# Patient Record
Sex: FEMALE | Race: WHITE | NOT HISPANIC OR LATINO | Employment: OTHER | RURAL
[De-identification: names, ages, dates, MRNs, and addresses within clinical notes are randomized per-mention and may not be internally consistent; named-entity substitution may affect disease eponyms.]

---

## 2017-02-06 RX ORDER — INDAPAMIDE 1.25 MG/1
1.25 TABLET, FILM COATED ORAL DAILY
Qty: 90 TABLET | Refills: 2 | Status: SHIPPED | OUTPATIENT
Start: 2017-02-06 | End: 2017-04-12 | Stop reason: DRUGHIGH

## 2017-03-29 RX ORDER — LOSARTAN POTASSIUM 100 MG/1
100 TABLET ORAL DAILY
Qty: 90 TABLET | Refills: 2 | Status: SHIPPED | OUTPATIENT
Start: 2017-03-29 | End: 2017-12-06 | Stop reason: SDUPTHER

## 2017-03-29 RX ORDER — PRAVASTATIN SODIUM 80 MG/1
80 TABLET ORAL DAILY
Qty: 90 TABLET | Refills: 1 | Status: SHIPPED | OUTPATIENT
Start: 2017-03-29 | End: 2017-10-03 | Stop reason: SDUPTHER

## 2017-04-12 ENCOUNTER — OFFICE VISIT (OUTPATIENT)
Dept: FAMILY MEDICINE CLINIC | Facility: CLINIC | Age: 82
End: 2017-04-12

## 2017-04-12 VITALS
OXYGEN SATURATION: 96 % | HEART RATE: 68 BPM | TEMPERATURE: 97.5 F | DIASTOLIC BLOOD PRESSURE: 60 MMHG | WEIGHT: 121.6 LBS | HEIGHT: 62 IN | BODY MASS INDEX: 22.38 KG/M2 | SYSTOLIC BLOOD PRESSURE: 108 MMHG

## 2017-04-12 DIAGNOSIS — Z87.2: Primary | ICD-10-CM

## 2017-04-12 PROCEDURE — 99213 OFFICE O/P EST LOW 20 MIN: CPT | Performed by: FAMILY MEDICINE

## 2017-04-12 RX ORDER — CLINDAMYCIN PHOSPHATE 11.9 MG/ML
SOLUTION TOPICAL 2 TIMES DAILY
Qty: 30 ML | Refills: 2 | Status: SHIPPED | OUTPATIENT
Start: 2017-04-12 | End: 2017-08-16 | Stop reason: DRUGHIGH

## 2017-04-12 RX ORDER — INDAPAMIDE 1.25 MG/1
1.25 TABLET, FILM COATED ORAL EVERY OTHER DAY
COMMUNITY
End: 2018-02-27

## 2017-04-12 RX ORDER — ASPIRIN 81 MG/1
81 TABLET ORAL EVERY OTHER DAY
COMMUNITY
End: 2017-08-16 | Stop reason: DRUGHIGH

## 2017-04-12 NOTE — PROGRESS NOTES
"Ezekiel Lynch    1934    Chief Complaint   Patient presents with   • Acne     AROUND NOSE AREA       Vitals:    04/12/17 1523   BP: 108/60   BP Location: Left arm   Patient Position: Sitting   Cuff Size: Adult   Pulse: 68   Temp: 97.5 °F (36.4 °C)   TempSrc: Oral   SpO2: 96%   Weight: 121 lb 9.6 oz (55.2 kg)   Height: 62\" (157.5 cm)       Acne   This is a recurrent problem. The current episode started more than 1 year ago. The problem occurs intermittently. The problem has been gradually worsening. Associated symptoms include a rash. Nothing aggravates the symptoms. She has tried nothing for the symptoms.       Review of Systems   Skin: Positive for rash.        Acne rosacea       Past Medical History:   Diagnosis Date   • Hyperlipidemia    • Hypertension          Current Outpatient Prescriptions:   •  aspirin 81 MG EC tablet, Take 81 mg by mouth Every Other Day., Disp: , Rfl:   •  indapamide (LOZOL) 1.25 MG tablet, Take 1.25 mg by mouth Every Other Day. TAKE ONE TABLET BY MOUTH EVERY OTHER DAY.  Monday Wednesday AND FRIDAY, Disp: , Rfl:   •  losartan (COZAAR) 100 MG tablet, Take 1 tablet by mouth Daily., Disp: 90 tablet, Rfl: 2  •  omeprazole (PriLOSEC) 40 MG capsule, Take 40 mg by mouth Daily., Disp: , Rfl:   •  pravastatin (PRAVACHOL) 80 MG tablet, Take 1 tablet by mouth Daily., Disp: 90 tablet, Rfl: 1  •  sertraline (ZOLOFT) 25 MG tablet, Take 1 tablet by mouth Daily., Disp: 30 tablet, Rfl: 2  •  spironolactone (ALDACTONE) 25 MG tablet, Take 1 tablet by mouth Daily., Disp: 90 tablet, Rfl: 1  •  spironolactone (ALDACTONE) 25 MG tablet, , Disp: , Rfl:   •  traZODone (DESYREL) 50 MG tablet, Take 1 tablet by mouth Every Night., Disp: 90 tablet, Rfl: 3  •  clindamycin (CLEOCIN T) 1 % external solution, Apply  topically 2 (Two) Times a Day., Disp: 30 mL, Rfl: 2    No Known Allergies    Patient Active Problem List   Diagnosis   • Essential hypertension   • Malignant neoplasm of female breast       Social History " "    Social History   • Marital status:      Spouse name: N/A   • Number of children: N/A   • Years of education: N/A     Social History Main Topics   • Smoking status: Former Smoker   • Smokeless tobacco: None   • Alcohol use Yes   • Drug use: None   • Sexual activity: Not Asked     Other Topics Concern   • None     Social History Narrative       Past Surgical History:   Procedure Laterality Date   • APPENDECTOMY     • HAND TENDON SURGERY     • HYSTERECTOMY     • TONSILLECTOMY         Physical Exam   Constitutional: She appears well-developed and well-nourished.   HENT:   Right Ear: External ear normal.   Left Ear: External ear normal.   Mouth/Throat: Oropharynx is clear and moist.   Nasolabial folds show acne   Cardiovascular: Normal rate and regular rhythm.    Pulmonary/Chest: Effort normal and breath sounds normal. No respiratory distress. She has no wheezes. She has no rales.   Neurological: She is alert.   Psychiatric: She has a normal mood and affect.   Vitals reviewed.      Vitals:    04/12/17 1523   BP: 108/60   BP Location: Left arm   Patient Position: Sitting   Cuff Size: Adult   Pulse: 68   Temp: 97.5 °F (36.4 °C)   TempSrc: Oral   SpO2: 96%   Weight: 121 lb 9.6 oz (55.2 kg)   Height: 62\" (157.5 cm)       Ezekiel was seen today for acne.    Diagnoses and all orders for this visit:    H/O acne rosacea    Other orders  -     clindamycin (CLEOCIN T) 1 % external solution; Apply  topically 2 (Two) Times a Day.        Problem List Items Addressed This Visit     None      Visit Diagnoses     H/O acne rosacea    -  Primary                Plan clindamycin solution    Also, allergy flareup-Michaela Wren Lathan Edwards Settle, MD  4/12/2017  "

## 2017-07-24 LAB — HM MAMMOGRAM: NORMAL

## 2017-08-14 ENCOUNTER — TELEPHONE (OUTPATIENT)
Dept: FAMILY MEDICINE CLINIC | Facility: CLINIC | Age: 82
End: 2017-08-14

## 2017-08-14 ENCOUNTER — OFFICE VISIT (OUTPATIENT)
Dept: FAMILY MEDICINE CLINIC | Facility: CLINIC | Age: 82
End: 2017-08-14

## 2017-08-14 VITALS
OXYGEN SATURATION: 99 % | BODY MASS INDEX: 22.12 KG/M2 | SYSTOLIC BLOOD PRESSURE: 138 MMHG | TEMPERATURE: 98.7 F | DIASTOLIC BLOOD PRESSURE: 82 MMHG | WEIGHT: 120.2 LBS | HEART RATE: 80 BPM | HEIGHT: 62 IN

## 2017-08-14 DIAGNOSIS — R10.9 ABDOMINAL PAIN, UNSPECIFIED LOCATION: Primary | ICD-10-CM

## 2017-08-14 DIAGNOSIS — K59.00 CONSTIPATION, UNSPECIFIED CONSTIPATION TYPE: ICD-10-CM

## 2017-08-14 PROBLEM — K21.9 GASTROESOPHAGEAL REFLUX DISEASE WITHOUT ESOPHAGITIS: Status: ACTIVE | Noted: 2017-08-14

## 2017-08-14 PROBLEM — E78.5 HYPERLIPIDEMIA: Status: ACTIVE | Noted: 2017-08-14

## 2017-08-14 LAB
BILIRUB BLD-MCNC: NEGATIVE MG/DL
CLARITY, POC: CLEAR
COLOR UR: ABNORMAL
GLUCOSE UR STRIP-MCNC: NEGATIVE MG/DL
KETONES UR QL: NEGATIVE
LEUKOCYTE EST, POC: NEGATIVE
NITRITE UR-MCNC: NEGATIVE MG/ML
PH UR: 8.5 [PH] (ref 5–8)
PROT UR STRIP-MCNC: ABNORMAL MG/DL
RBC # UR STRIP: NEGATIVE /UL
SP GR UR: 1.01 (ref 1–1.03)
UROBILINOGEN UR QL: NORMAL

## 2017-08-14 PROCEDURE — 81003 URINALYSIS AUTO W/O SCOPE: CPT | Performed by: NURSE PRACTITIONER

## 2017-08-14 PROCEDURE — 99214 OFFICE O/P EST MOD 30 MIN: CPT | Performed by: NURSE PRACTITIONER

## 2017-08-14 NOTE — PATIENT INSTRUCTIONS
Abdominal Pain, Adult  Many things can cause abdominal pain. Usually, abdominal pain is not caused by a disease and will improve without treatment. It can often be observed and treated at home. Your health care provider will do a physical exam and possibly order blood tests and X-rays to help determine the seriousness of your pain. However, in many cases, more time must pass before a clear cause of the pain can be found. Before that point, your health care provider may not know if you need more testing or further treatment.  HOME CARE INSTRUCTIONS  Monitor your abdominal pain for any changes. The following actions may help to alleviate any discomfort you are experiencing:  · Only take over-the-counter or prescription medicines as directed by your health care provider.  · Do not take laxatives unless directed to do so by your health care provider.  · Try a clear liquid diet (broth, tea, or water) as directed by your health care provider. Slowly move to a bland diet as tolerated.  SEEK MEDICAL CARE IF:  · You have unexplained abdominal pain.  · You have abdominal pain associated with nausea or diarrhea.  · You have pain when you urinate or have a bowel movement.  · You experience abdominal pain that wakes you in the night.  · You have abdominal pain that is worsened or improved by eating food.  · You have abdominal pain that is worsened with eating fatty foods.  · You have a fever.  SEEK IMMEDIATE MEDICAL CARE IF:  · Your pain does not go away in the time recommended by your healthcare provider  · You keep throwing up (vomiting).  · Your pain is felt only in portions of the abdomen, such as the right side or the left lower portion of the abdomen.  · You pass bloody or black tarry stools.  MAKE SURE YOU:  · Understand these instructions.  · Will watch your condition.  · Will get help right away if you are not doing well or get worse.     This information is not intended to replace advice given to you by your health  care provider. Make sure you discuss any questions you have with your health care provider.     Document Released: 09/27/2006 Document Revised: 09/07/2016 Document Reviewed: 08/27/2014  [email or web address] Interactive Patient Education ©2017 [employer]

## 2017-08-14 NOTE — PROGRESS NOTES
Chief Complaint   Patient presents with   • Abdominal Pain        No Known Allergies    HPI:  Ezekiel Lynch is a 82 y.o. female presents  today with abdominal pain low, and back pain that started yesterday, denies any fever or chills.  Says she has constipation and she has been having a little bowel movement but says she was not emptying.  She was taking miralax and it worked well but she quit taking it about a yr ago because she had bowel movement 4-5 times a day. Says she has not had any in 1 yr and she thought she would take it again because she struggles with constipation.  Says that after taking it couple days ago that's when the problems began.  Has history of diverticulitis about 2 yrs ago.  Has been constipation so took milk of mag and had BM today, and pain is better but she is still hurting.  Chronic problems include rosacea stable with clindamycin cream, HTN stable with losartan says she doesn't take the lozol much, GERD stable with omeprazole, hyperlipidemia stable with pravastatin, insomnia stable with trazodone.          Past Medical History:   Diagnosis Date   • Hyperlipidemia      Past Surgical History:   Procedure Laterality Date   • APPENDECTOMY     • HAND TENDON SURGERY     • HYSTERECTOMY     • TONSILLECTOMY       Social History     Social History   • Marital status:      Spouse name: N/A   • Number of children: N/A   • Years of education: N/A     Social History Main Topics   • Smoking status: Former Smoker   • Smokeless tobacco: None   • Alcohol use Yes   • Drug use: None   • Sexual activity: Not Asked     Other Topics Concern   • None     Social History Narrative   • None     Family History   Problem Relation Age of Onset   • Heart attack Mother    • Cancer Father    • No Known Problems Daughter    • No Known Problems Maternal Grandmother    • No Known Problems Maternal Grandfather    • No Known Problems Paternal Grandmother    • No Known Problems Paternal Grandfather    • Brain cancer  "Son    • Diabetes Son        Current Outpatient Prescriptions on File Prior to Visit   Medication Sig Dispense Refill   • aspirin 81 MG EC tablet Take 81 mg by mouth Every Other Day.     • clindamycin (CLEOCIN T) 1 % external solution Apply  topically 2 (Two) Times a Day. 30 mL 2   • indapamide (LOZOL) 1.25 MG tablet Take 1.25 mg by mouth Every Other Day. TAKE ONE TABLET BY MOUTH EVERY OTHER DAY.  Monday Wednesday AND FRIDAY     • losartan (COZAAR) 100 MG tablet Take 1 tablet by mouth Daily. 90 tablet 2   • omeprazole (PriLOSEC) 40 MG capsule Take 40 mg by mouth Daily.     • pravastatin (PRAVACHOL) 80 MG tablet Take 1 tablet by mouth Daily. 90 tablet 1   • spironolactone (ALDACTONE) 25 MG tablet Take 1 tablet by mouth Daily. 90 tablet 1   • traZODone (DESYREL) 50 MG tablet Take 1 tablet by mouth Every Night. 90 tablet 3   • [DISCONTINUED] sertraline (ZOLOFT) 25 MG tablet Take 1 tablet by mouth Daily. 30 tablet 2   • [DISCONTINUED] spironolactone (ALDACTONE) 25 MG tablet        No current facility-administered medications on file prior to visit.         REVIEW OF SYMPTOMS: (Positives bolded)  General:  weight loss, fever, chills, night sweats, fatigue, appetite loss  Cardiovascular:  chest pain, palpitation, edema,  syncope, swelling of extremities  Gastro: Nausea, vomiting, diarrhea, hematemesis, abdominal pain, constipation  Genito: hematuria, dysuria, glycosuria, hesitancy, frequency, incontinence  Skin: rash,new lesions, pruritus, jaundice  Neuro:  Migraine, numbness, ataxia, tremor, vertigo, weakness, memory loss, Irritability, dizziness  Endocrine: excessive thirst, polyuria, cold intolerance, heat intolerance, goiter  \"All other systems reviewed and negative, except as listed above.”    OBJECTIVE:  Constitutional:  Appearance-No acute distress, Is pt in pain exhibited by facial expression, restlessness or constant movement.    Consistent with stated age. Orientation- Oriented x 3, alert Posture-Not doubled " over. Gait-Normal pace, normal arm movement. Posture- Normal Build and Nutrition-Well developed and well nourished.  General- Patient is pleasant and cooperative with the interview and exam.    Integumentary: General-No rashes, ulcers or lesions. No edema.  Jaundice. Palpation- Normal skin moisture/turgor. Skin is warm to touch, no increased warmth. Capillary refill is normal bilateral Upper and lower extremity.   EYES: PERRLA, EOMI, no discharge, no ciliary flushing, no conjunctival injection, no jaundice, upper and lower eyelids normal, Cornea normal,  Lens normal, Eye ball normal, No matting or discharge on eye lashes, Fundi normal, no papilledema, No Foreign body present      Head/Neck: Head- normocephalic and atraumatic.  Neck- without visible/palpable lumps or pulsations.  Palpation- No bony tenderness about head/neck along frontal, occiptial, temporal, parietal, mastoid, jawline, zygoma, orbit or any other location.  NO temporal artery tenderness. No TMJ tenderness. Normal cervical ROM.   Neck Supple.  Thyroid-No thyromegaly, no nodules    CHEST/LUNG: Inspection- symmetric chest wall no pectus deformity. Normal effort, no distress, no use of accessory muscles. Palpation- nontender sternum, ribline.  No abnormal pulsations. Auscultation- Breath sounds normal throughout all lung fields.  Normal tracheal sounds, Normal bronchial sounds overlying sternum, Bronchovessicular sounds normal between scapulae posteriorly, Normal vessicular breath sounds heard throughout periphery. Lungs are clear today. Adventitious sounds- No wheezes, rales, rhonchi.     CARDIOVASCULAR:  Carotid artery- normal, no bruits or abnormal pulsations. Jugular vein- no pulsations. Palpation/Percussion- Normal PMI, no palpable thrill  Auscultation- Regular rate and rhythm. No murmur noted in sitting, supine positions. Extremities- no digital clubbing, cyanosis, edema, increased warmth.    ABDOMEN: Inspection- normal and no visible pulsations.  "Normal contour. Auscultation- Bowel sounds normal, no abdominal bruits. Palpation/Percussion- soft, non-tender, no rebound tenderness, no rigidity (guarding), no jar tenderness, no masses.  Liver-no hepatomegaly, Spleen - no splenomegaly, Hernias- none. Rectal not examined. CVA tenderness    Musculoskeletal: Generalized-No generalized swelling or edema of extremities, no digital clubbing or cyanosis, neurovascularly intact all four extremities.    Spine/Ribs- No deformities, masses or tenderness, no known fractures, normal strength, Normal ROM. Normal stability No tenderness along C/T/L spine.  Normal appearing ROM about spine.      Neuropsych: Oriented- Person, place, time. (AAOx3), Mood/affect- normal and congruent. Able to articulate well. Speech-Normal speech, normal rate, normal tone, normal use of language, volume and coherence.  Thought content- normal with ability to perform basic computations and apply abstract thought/reason. Associations- intact, no SI/HI, no hallucinations, delusions, obsessions.  Judgment/insight- Appropriate. Memory-Recall intact, remote and recent memory intact. Knowledge- Age appropriate fund of knowledge, concentration and attention span normal.    Lymphatic: Head/Neck- normal size and non tender to palpation. Axillary- Head and neck LN are normal size and non tender to palpation. Femoral and Inguinal- normal size and non tender to palpation.    /82 (BP Location: Right arm, Patient Position: Sitting, Cuff Size: Adult)  Pulse 80  Temp 98.7 °F (37.1 °C) (Core)   Ht 62\" (157.5 cm)  Wt 120 lb 3.2 oz (54.5 kg)  LMP  (LMP Unknown)  SpO2 99%  BMI 21.98 kg/m2  Check for orthostatic changes in BP    Assessment  Ezekiel was seen today for abdominal pain.    Diagnoses and all orders for this visit:    Abdominal pain, unspecified location  -     POCT urinalysis dipstick, automated  -     XR Abdomen Flat & Upright  -     CBC & Differential  -     Comprehensive Metabolic Panel  -     " Amylase  -     Lipase    Constipation, unspecified constipation type  -     XR Abdomen Flat & Upright:  I will review xray when results are available and make recommendation.        Definition:  Recent onset (less than 24-48 hrs) of moderate to moderately severe abdominal pain that has either progressively worsened or failed to resolve.  Abdominal pain comprises approximately 10% of ER visits.  For patients seeking care in primary care settings, it is important to differentiate between those requiring urgent or emergent care.     Differential:  Diffuse or Generalized: Peritonitis, gastroenteritis, endocrine disorders (diabetic ketoacidosis, david disease), small bowel obstruction  RUQ: Disease of biliary tract, (cholelithiasis, gallstones, acute cholecystitis), disease of the liver (acute viral or drug-induced hepatitis), Acute pancreatitis, extra-abdominal etiologies (pleural or pulmonary pathology)  RLQ: Appendicitis, reproductive tract disorders in women (ovarian cyst/torsion, ectopic pregnancy, pelvic inflammatory disease, endometriosis) Extra-abdominal etiologies (retroperitoneal pathology such as uretic obstruction, cystitis)  LUQ: Dyspepsia, disease of the spleen (abscess, infarct), peptic ulcer disease, extra-abdominal etiologies (myocardial ischemia/infarction, pleural or pulmonary pathology)  LLQ: Diverticular disease (diverticulitis), colitis, ileitis, reproductive tract disorders and extra-abdominal etiologies as described for RLQ  Midline Locations: (Epigastric, periumbilical, suprapubic), acute pancreatitis, functional dyspepsia, hepatobiliary etiologies, small bowel obstruction, extra-abdominal etiologies (myocardial infarction)         POCT urinalysis dipstick, automated   Order: [id number]   Status:  Final result   Visible to patient:  No (Not Released) Dx:  Abdominal pain, unspecified location         Ref Range & Units 9:43 AM   9mo ago      Color Yellow, Straw, Dark Yellow, Fernanda Dark Yellow  Yellow    Clarity, UA Clear Clear Clear    Glucose, UA Negative, 1000 mg/dL (3+) mg/dL Negative NegativeR    Bilirubin Negative Negative Negative    Ketones, UA Negative Negative Negative    Specific Gravity  1.005 - 1.030 1.015 1.015    Blood, UA Negative Negative Negative    pH, Urine 5.0 - 8.0 8.5 (A) 7.0    Protein, POC Negative mg/dL Trace (A) Negative    Urobilinogen, UA Normal Normal Normal    Leukocytes Negative Negative Negative    Nitrite, UA Negative Negative Negative   Resulting Agency  [facility]      Specimen Collected: 08/14/17  9:43 AM Last Resulted: 08/14/17  9:43 AM                Go directly to ER if pain worsens or continues other winter follow up with Dr. Lazo 1 week.    Ada Edouard, NATALIE, APRN-BC  Ada Edouard DNP, APRN-BC

## 2017-08-14 NOTE — TELEPHONE ENCOUNTER
PER NASIM-PT WAS ADVISED TO F/U IN OFFICE UPON DR BARRAGAN RETURN TO DISCUSS XRAY RESULT-ADVISED CALCIFIED SPLENIC AORTIC ANEURYSM. GO TO ER IF PAIN PERSISTS. PT VOICED UNDERSTANDING. APPT MADE FOR 08/16/17.

## 2017-08-15 LAB
ALBUMIN SERPL-MCNC: 4.5 G/DL (ref 3.5–5)
ALBUMIN/GLOB SERPL: 1.7 G/DL (ref 1.1–2.5)
ALP SERPL-CCNC: 76 U/L (ref 24–120)
ALT SERPL-CCNC: 40 U/L (ref 0–54)
AMYLASE SERPL-CCNC: 52 U/L (ref 30–110)
AST SERPL-CCNC: 26 U/L (ref 7–45)
BASOPHILS # BLD AUTO: 0.03 10*3/MM3 (ref 0–0.2)
BASOPHILS NFR BLD AUTO: 0.3 % (ref 0–2)
BILIRUB SERPL-MCNC: 1 MG/DL (ref 0.1–1)
BUN SERPL-MCNC: 11 MG/DL (ref 5–21)
BUN/CREAT SERPL: 17.7 (ref 7–25)
CALCIUM SERPL-MCNC: 9.7 MG/DL (ref 8.4–10.4)
CHLORIDE SERPL-SCNC: 97 MMOL/L (ref 98–110)
CO2 SERPL-SCNC: 26 MMOL/L (ref 24–31)
CREAT SERPL-MCNC: 0.62 MG/DL (ref 0.5–1.4)
EOSINOPHIL # BLD AUTO: 0.04 10*3/MM3 (ref 0–0.7)
EOSINOPHIL NFR BLD AUTO: 0.4 % (ref 0–4)
ERYTHROCYTE [DISTWIDTH] IN BLOOD BY AUTOMATED COUNT: 12.9 % (ref 12–15)
GLOBULIN SER CALC-MCNC: 2.6 GM/DL
GLUCOSE SERPL-MCNC: 103 MG/DL (ref 70–100)
HCT VFR BLD AUTO: 38.1 % (ref 37–47)
HGB BLD-MCNC: 12.5 G/DL (ref 12–16)
IMM GRANULOCYTES # BLD: 0.03 10*3/MM3 (ref 0–0.03)
IMM GRANULOCYTES NFR BLD: 0.3 % (ref 0–5)
LIPASE SERPL-CCNC: 147 U/L (ref 23–203)
LYMPHOCYTES # BLD AUTO: 2.13 10*3/MM3 (ref 0.72–4.86)
LYMPHOCYTES NFR BLD AUTO: 20.1 % (ref 15–45)
MCH RBC QN AUTO: 29.3 PG (ref 28–32)
MCHC RBC AUTO-ENTMCNC: 32.8 G/DL (ref 33–36)
MCV RBC AUTO: 89.4 FL (ref 82–98)
MONOCYTES # BLD AUTO: 1.1 10*3/MM3 (ref 0.19–1.3)
MONOCYTES NFR BLD AUTO: 10.4 % (ref 4–12)
NEUTROPHILS # BLD AUTO: 7.26 10*3/MM3 (ref 1.87–8.4)
NEUTROPHILS NFR BLD AUTO: 68.5 % (ref 39–78)
PLATELET # BLD AUTO: 263 10*3/MM3 (ref 130–400)
POTASSIUM SERPL-SCNC: 4.4 MMOL/L (ref 3.5–5.3)
PROT SERPL-MCNC: 7.1 G/DL (ref 6.3–8.7)
RBC # BLD AUTO: 4.26 10*6/MM3 (ref 4.2–5.4)
SODIUM SERPL-SCNC: 132 MMOL/L (ref 135–145)
WBC # BLD AUTO: 10.59 10*3/MM3 (ref 4.8–10.8)

## 2017-08-16 ENCOUNTER — OFFICE VISIT (OUTPATIENT)
Dept: FAMILY MEDICINE CLINIC | Facility: CLINIC | Age: 82
End: 2017-08-16

## 2017-08-16 VITALS
SYSTOLIC BLOOD PRESSURE: 132 MMHG | DIASTOLIC BLOOD PRESSURE: 76 MMHG | HEART RATE: 84 BPM | OXYGEN SATURATION: 99 % | TEMPERATURE: 98.4 F | HEIGHT: 62 IN | WEIGHT: 118.2 LBS | BODY MASS INDEX: 21.75 KG/M2

## 2017-08-16 DIAGNOSIS — R10.32 LEFT LOWER QUADRANT PAIN: Primary | ICD-10-CM

## 2017-08-16 PROCEDURE — 99213 OFFICE O/P EST LOW 20 MIN: CPT | Performed by: FAMILY MEDICINE

## 2017-08-16 RX ORDER — ASPIRIN 81 MG/1
81 TABLET ORAL EVERY OTHER DAY
COMMUNITY

## 2017-08-16 RX ORDER — CLINDAMYCIN PHOSPHATE 10 UG/ML
LOTION TOPICAL AS NEEDED
COMMUNITY
End: 2019-06-07 | Stop reason: SDUPTHER

## 2017-08-16 RX ORDER — FAMOTIDINE 20 MG
1000 TABLET ORAL DAILY
COMMUNITY

## 2017-08-16 NOTE — PROGRESS NOTES
Subjective   Ezekiel Lynch is a 82 y.o. female.     Abdominal Pain   This is a new problem. The current episode started in the past 7 days. The onset quality is gradual. The problem occurs intermittently. The problem has been gradually improving. The pain is located in the LLQ. The pain is at a severity of 2/10. The pain is mild. The quality of the pain is colicky. The abdominal pain radiates to the LLQ. Associated symptoms include constipation. The pain is aggravated by bowel movement. The pain is relieved by bowel movements. She has tried nothing for the symptoms. The treatment provided mild relief. Her past medical history is significant for abdominal surgery.       The following portions of the patient's history were reviewed and updated as appropriate: allergies, current medications, past family history, past medical history, past social history, past surgical history and problem list.    Review of Systems   Respiratory: Negative for chest tightness.    Cardiovascular: Negative for leg swelling.   Gastrointestinal: Positive for abdominal pain and constipation.       Objective   Physical Exam   Constitutional: She is oriented to person, place, and time. She appears well-developed and well-nourished.   Abdominal: Soft. Bowel sounds are normal. There is no tenderness.   Musculoskeletal: She exhibits no edema.   Neurological: She is alert and oriented to person, place, and time.   Skin: Skin is warm and dry.   Psychiatric: She has a normal mood and affect. Her behavior is normal. Thought content normal.   Vitals reviewed.      Assessment/Plan   Ezekiel was seen today for follow-up.    Diagnoses and all orders for this visit:    Left lower quadrant pain       Continue dietary management of apparent diverticulosis-improved

## 2017-10-03 ENCOUNTER — CLINICAL SUPPORT (OUTPATIENT)
Dept: FAMILY MEDICINE CLINIC | Facility: CLINIC | Age: 82
End: 2017-10-03

## 2017-10-03 DIAGNOSIS — Z23 NEED FOR INFLUENZA VACCINATION: Primary | ICD-10-CM

## 2017-10-03 PROCEDURE — 90662 IIV NO PRSV INCREASED AG IM: CPT | Performed by: FAMILY MEDICINE

## 2017-10-03 PROCEDURE — 90471 IMMUNIZATION ADMIN: CPT | Performed by: FAMILY MEDICINE

## 2017-10-03 RX ORDER — PRAVASTATIN SODIUM 80 MG/1
TABLET ORAL
Qty: 90 TABLET | Refills: 0 | Status: SHIPPED | OUTPATIENT
Start: 2017-10-03 | End: 2017-12-06 | Stop reason: SDUPTHER

## 2017-10-20 ENCOUNTER — CLINICAL SUPPORT (OUTPATIENT)
Dept: FAMILY MEDICINE CLINIC | Facility: CLINIC | Age: 82
End: 2017-10-20

## 2017-10-20 DIAGNOSIS — N39.0 URINARY TRACT INFECTION WITHOUT HEMATURIA, SITE UNSPECIFIED: Primary | ICD-10-CM

## 2017-10-20 LAB
BILIRUB BLD-MCNC: NEGATIVE MG/DL
CLARITY, POC: CLEAR
COLOR UR: YELLOW
GLUCOSE UR STRIP-MCNC: NEGATIVE MG/DL
KETONES UR QL: NEGATIVE
LEUKOCYTE EST, POC: NEGATIVE
NITRITE UR-MCNC: NEGATIVE MG/ML
PH UR: 6 [PH] (ref 5–8)
PROT UR STRIP-MCNC: NEGATIVE MG/DL
RBC # UR STRIP: NEGATIVE /UL
SP GR UR: 1.01 (ref 1–1.03)
UROBILINOGEN UR QL: NORMAL

## 2017-10-20 PROCEDURE — 81003 URINALYSIS AUTO W/O SCOPE: CPT | Performed by: FAMILY MEDICINE

## 2017-11-01 RX ORDER — SPIRONOLACTONE 25 MG/1
25 TABLET ORAL DAILY
Qty: 90 TABLET | Refills: 2 | Status: SHIPPED | OUTPATIENT
Start: 2017-11-01 | End: 2018-11-26 | Stop reason: SDUPTHER

## 2017-12-05 ENCOUNTER — OFFICE VISIT (OUTPATIENT)
Dept: FAMILY MEDICINE CLINIC | Facility: CLINIC | Age: 82
End: 2017-12-05

## 2017-12-05 VITALS
SYSTOLIC BLOOD PRESSURE: 116 MMHG | WEIGHT: 118.6 LBS | HEIGHT: 63 IN | BODY MASS INDEX: 21.02 KG/M2 | DIASTOLIC BLOOD PRESSURE: 64 MMHG | HEART RATE: 69 BPM | TEMPERATURE: 97.7 F | OXYGEN SATURATION: 98 %

## 2017-12-05 DIAGNOSIS — E78.2 MIXED HYPERLIPIDEMIA: ICD-10-CM

## 2017-12-05 DIAGNOSIS — R53.83 FATIGUE, UNSPECIFIED TYPE: ICD-10-CM

## 2017-12-05 DIAGNOSIS — Z12.12 SCREENING FOR MALIGNANT NEOPLASM OF THE RECTUM: ICD-10-CM

## 2017-12-05 DIAGNOSIS — E55.9 VITAMIN D INSUFFICIENCY: ICD-10-CM

## 2017-12-05 DIAGNOSIS — Z00.00 ANNUAL PHYSICAL EXAM: Primary | ICD-10-CM

## 2017-12-05 LAB
25(OH)D3+25(OH)D2 SERPL-MCNC: 48.9 NG/ML (ref 30–100)
ALBUMIN SERPL-MCNC: 4.6 G/DL (ref 3.5–5)
ALBUMIN/GLOB SERPL: 1.6 G/DL (ref 1.1–2.5)
ALP SERPL-CCNC: 77 U/L (ref 24–120)
ALT SERPL-CCNC: 38 U/L (ref 0–54)
AST SERPL-CCNC: 32 U/L (ref 7–45)
BASOPHILS # BLD AUTO: 0.08 10*3/MM3 (ref 0–0.2)
BASOPHILS NFR BLD AUTO: 1.4 % (ref 0–2)
BILIRUB BLD-MCNC: NEGATIVE MG/DL
BILIRUB SERPL-MCNC: 0.8 MG/DL (ref 0.1–1)
BUN SERPL-MCNC: 12 MG/DL (ref 5–21)
BUN/CREAT SERPL: 16.9 (ref 7–25)
CALCIUM SERPL-MCNC: 10.1 MG/DL (ref 8.4–10.4)
CHLORIDE SERPL-SCNC: 96 MMOL/L (ref 98–110)
CHOLEST SERPL-MCNC: 199 MG/DL (ref 130–200)
CLARITY, POC: CLEAR
CO2 SERPL-SCNC: 28 MMOL/L (ref 24–31)
COLOR UR: YELLOW
CREAT SERPL-MCNC: 0.71 MG/DL (ref 0.5–1.4)
EOSINOPHIL # BLD AUTO: 0.06 10*3/MM3 (ref 0–0.7)
EOSINOPHIL NFR BLD AUTO: 1.1 % (ref 0–4)
ERYTHROCYTE [DISTWIDTH] IN BLOOD BY AUTOMATED COUNT: 12.5 % (ref 12–15)
GFR SERPLBLD CREATININE-BSD FMLA CKD-EPI: 79 ML/MIN/1.73
GFR SERPLBLD CREATININE-BSD FMLA CKD-EPI: 95 ML/MIN/1.73
GLOBULIN SER CALC-MCNC: 2.9 GM/DL
GLUCOSE SERPL-MCNC: 94 MG/DL (ref 70–100)
GLUCOSE UR STRIP-MCNC: NEGATIVE MG/DL
HCT VFR BLD AUTO: 40.8 % (ref 37–47)
HDLC SERPL-MCNC: 73 MG/DL
HGB BLD-MCNC: 13.5 G/DL (ref 12–16)
IMM GRANULOCYTES # BLD: 0.02 10*3/MM3 (ref 0–0.03)
IMM GRANULOCYTES NFR BLD: 0.4 % (ref 0–5)
KETONES UR QL: NEGATIVE
LDLC SERPL CALC-MCNC: 105 MG/DL (ref 0–99)
LEUKOCYTE EST, POC: NEGATIVE
LYMPHOCYTES # BLD AUTO: 3.08 10*3/MM3 (ref 0.72–4.86)
LYMPHOCYTES NFR BLD AUTO: 54.5 % (ref 15–45)
MCH RBC QN AUTO: 29.1 PG (ref 28–32)
MCHC RBC AUTO-ENTMCNC: 33.1 G/DL (ref 33–36)
MCV RBC AUTO: 87.9 FL (ref 82–98)
MONOCYTES # BLD AUTO: 0.49 10*3/MM3 (ref 0.19–1.3)
MONOCYTES NFR BLD AUTO: 8.7 % (ref 4–12)
NEUTROPHILS # BLD AUTO: 1.92 10*3/MM3 (ref 1.87–8.4)
NEUTROPHILS NFR BLD AUTO: 33.9 % (ref 39–78)
NITRITE UR-MCNC: NEGATIVE MG/ML
NRBC BLD AUTO-RTO: 0 /100 WBC (ref 0–0)
PH UR: 7 [PH] (ref 5–8)
PLATELET # BLD AUTO: 327 10*3/MM3 (ref 130–400)
POTASSIUM SERPL-SCNC: 4.7 MMOL/L (ref 3.5–5.3)
PROT SERPL-MCNC: 7.5 G/DL (ref 6.3–8.7)
PROT UR STRIP-MCNC: NEGATIVE MG/DL
RBC # BLD AUTO: 4.64 10*6/MM3 (ref 4.2–5.4)
RBC # UR STRIP: NEGATIVE /UL
SODIUM SERPL-SCNC: 133 MMOL/L (ref 135–145)
SP GR UR: 1.01 (ref 1–1.03)
T4 FREE SERPL-MCNC: 1.2 NG/DL (ref 0.78–2.19)
TRIGL SERPL-MCNC: 105 MG/DL (ref 0–149)
TSH SERPL DL<=0.005 MIU/L-ACNC: 2.75 MIU/ML (ref 0.47–4.68)
UROBILINOGEN UR QL: NORMAL
VLDLC SERPL CALC-MCNC: 21 MG/DL
WBC # BLD AUTO: 5.65 10*3/MM3 (ref 4.8–10.8)

## 2017-12-05 PROCEDURE — 81003 URINALYSIS AUTO W/O SCOPE: CPT | Performed by: FAMILY MEDICINE

## 2017-12-05 PROCEDURE — 96160 PT-FOCUSED HLTH RISK ASSMT: CPT | Performed by: FAMILY MEDICINE

## 2017-12-05 PROCEDURE — G0439 PPPS, SUBSEQ VISIT: HCPCS | Performed by: FAMILY MEDICINE

## 2017-12-05 RX ORDER — DOXYCYCLINE HYCLATE 50 MG/1
50 CAPSULE ORAL DAILY
COMMUNITY
Start: 2017-11-29 | End: 2018-02-27

## 2017-12-05 NOTE — PROGRESS NOTES
"QUICK REFERENCE INFORMATION:  The ABCs of the Annual Wellness Visit    Subsequent Medicare Wellness Visit    HEALTH RISK ASSESSMENT    1934    Recent Hospitalizations:  No hospitalization(s) within the last year..        Current Medical Providers:  Patient Care Team:  Lee Akins MD as PCP - General (Family Medicine)        Smoking Status:  History   Smoking Status   • Former Smoker   Smokeless Tobacco   • Not on file       Alcohol Consumption:  History   Alcohol Use   • Yes       Depression Screen:   PHQ-2/PHQ-9 Depression Screening 8/14/2017   Little interest or pleasure in doing things 0   Feeling down, depressed, or hopeless 0   Trouble falling or staying asleep, or sleeping too much -   Feeling tired or having little energy -   Poor appetite or overeating -   Feeling bad about yourself - or that you are a failure or have let yourself or your family down -   Trouble concentrating on things, such as reading the newspaper or watching television -   Moving or speaking so slowly that other people could have noticed. Or the opposite - being so fidgety or restless that you have been moving around a lot more than usual -   Thoughts that you would be better off dead, or of hurting yourself in some way -   Total Score 0   If you checked off any problems, how difficult have these problems made it for you to do your work, take care of things at home, or get along with other people? -      -- The Timed Up and Go (TUG) Test (CDC Version)                  - Testing directions adhered to:                                  1) Patients wears regular footwear and may use walking aid(s) if    needed.                                  2) Patient to sit back in standard arm chair and identify a line 10 feet    away from patient on floor.                                  3) Test time begins at \"Go\" and stops when patient reseated in arm    chair.                    - Instructions read aloud (and demonstrated as " "applicable) to patient:                                      When I say \"Go,\" I want you to:                                    1) Stand up from the chair.                                  2) Walk to the line on the floor (10 feet away) at your normal pace.                                  3) Turn.                                  4) Walk back to the chair at your normal pace.                                  5) Sit down again.                    - Result: 3                    - Observations during test:Normal gait        Health Habits and Functional and Cognitive Screening:  No flowsheet data found.        Does the patient have evidence of cognitive impairment? Yes    Aspirin use counseling: Taking ASA appropriately as indicated      Recent Lab Results:  CMP:  Lab Results   Component Value Date     (H) 08/14/2017    BUN 11 08/14/2017    CREATININE 0.62 08/14/2017    EGFRIFNONA 92 08/14/2017    EGFRIFAFRI 112 08/14/2017    BCR 17.7 08/14/2017     (L) 08/14/2017    K 4.4 08/14/2017    CO2 26.0 08/14/2017    CALCIUM 9.7 08/14/2017    PROTENTOTREF 7.1 08/14/2017    ALBUMIN 4.50 08/14/2017    LABGLOBREF 2.6 08/14/2017    LABIL2 1.7 08/14/2017    BILITOT 1.0 08/14/2017    ALKPHOS 76 08/14/2017    AST 26 08/14/2017    ALT 40 08/14/2017     Lipid Panel:  Lab Results   Component Value Date    TRIG 164 (H) 11/14/2016    HDL 69 11/14/2016    VLDL 32.8 11/14/2016     HbA1c:       Visual Acuity:  Right ey 20/20 and Left eye 20/30 w/glasses.    Age-appropriate Screening Schedule:  Refer to the list below for future screening recommendations based on patient's age, sex and/or medical conditions. Orders for these recommended tests are listed in the plan section. The patient has been provided with a written plan.    Health Maintenance   Topic Date Due   • TDAP/TD VACCINES (2 - Td) 04/17/2017   • LIPID PANEL  11/14/2017   • INFLUENZA VACCINE  Completed   • PNEUMOCOCCAL VACCINES (65+ LOW/MEDIUM RISK)  Completed   • " ZOSTER VACCINE  Completed        Subjective   History of Present Illness    Ezekiel Lynch is a 83 y.o. female who presents for an Subsequent Wellness Visit.    The following portions of the patient's history were reviewed and updated as appropriate: allergies, current medications, past family history, past medical history, past social history, past surgical history and problem list.    Outpatient Medications Prior to Visit   Medication Sig Dispense Refill   • aspirin 81 MG EC tablet Take 81 mg by mouth Every Other Day.     • clindamycin (CLEOCIN T) 1 % lotion Apply  topically As Needed.     • Docusate Calcium (STOOL SOFTENER PO) Take  by mouth As Needed.     • indapamide (LOZOL) 1.25 MG tablet Take 1.25 mg by mouth Every Other Day. TAKE ONE TABLET BY MOUTH EVERY OTHER DAY.  Monday Wednesday AND FRIDAY     • losartan (COZAAR) 100 MG tablet Take 1 tablet by mouth Daily. 90 tablet 2   • omeprazole (PriLOSEC) 40 MG capsule Take 40 mg by mouth Daily.     • pravastatin (PRAVACHOL) 80 MG tablet TAKE 1 TABLET EVERY DAY 90 tablet 0   • spironolactone (ALDACTONE) 25 MG tablet Take 1 tablet by mouth Daily. 90 tablet 2   • Vitamin D, Cholecalciferol, 1000 units capsule Take 1,000 Units by mouth Daily.       No facility-administered medications prior to visit.        Patient Active Problem List   Diagnosis   • Essential hypertension   • Malignant neoplasm of female breast   • Hyperlipidemia   • Gastroesophageal reflux disease without esophagitis       Advance Care Planning:  has NO advance directive - information provided to the patient today    Identification of Risk Factors:  Risk factors include: cardiovascular risk and increased fall risk.    Review of Systems   Genitourinary:        Sees [facility]/oncologist yearly   All other systems reviewed and are negative.      Compared to one year ago, the patient feels her physical health is the same.  Compared to one year ago, the patient feels her mental health is the  "same.    Objective     Physical Exam   Constitutional: She is oriented to person, place, and time. She appears well-developed and well-nourished.   HENT:   Right Ear: External ear normal.   Left Ear: External ear normal.   Mouth/Throat: Oropharynx is clear and moist.   Eyes: EOM are normal. Pupils are equal, round, and reactive to light.   Neck: No thyromegaly present.   Good carotid pulses   Cardiovascular: Normal rate and regular rhythm.    Pulmonary/Chest: Effort normal and breath sounds normal.   Breasts-no masses noted-remote scar on right   Abdominal: Soft. Bowel sounds are normal. She exhibits no mass. There is no tenderness.   Musculoskeletal: Normal range of motion.   Lymphadenopathy:     She has no cervical adenopathy.   Neurological: She is alert and oriented to person, place, and time.   Skin: Skin is warm and dry.   Psychiatric: She has a normal mood and affect. Her behavior is normal. Judgment and thought content normal.   Nursing note and vitals reviewed.      Vitals:    12/05/17 1429   BP: 116/64   BP Location: Left arm   Patient Position: Sitting   Cuff Size: Adult   Pulse: 69   Temp: 97.7 °F (36.5 °C)   TempSrc: Oral   SpO2: 98%   Weight: 53.8 kg (118 lb 9.6 oz)   Height: 160 cm (63\")   PainSc: 0-No pain       Body mass index is 21.01 kg/(m^2).  Discussed the patient's BMI with her. BMI is within normal parameters. No follow-up required..    Assessment/Plan   Patient Self-Management and Personalized Health Advice  The patient has been provided with information about: exercise, prevention of cardiac or vascular disease and fall prevention and preventive services including:   · Colorectal cancer screening, fecal occult blood test, Fall Risk plan of care done.    Visit Diagnoses:    ICD-10-CM ICD-9-CM   1. Annual physical exam Z00.00 V70.0   2. Fatigue, unspecified type R53.83 780.79   3. Mixed hyperlipidemia E78.2 272.2   4. Vitamin D insufficiency E55.9 268.9   5. Screening for malignant neoplasm of " the rectum Z12.12 V76.41       Orders Placed This Encounter   Procedures   • TSH   • T4, free   • Comprehensive Metabolic Panel   • Lipid Panel   • Vitamin D 25 Hydroxy   • POC Occult Blood X 3, Stool     Standing Status:   Future     Standing Expiration Date:   12/5/2018   • CBC & Differential     Order Specific Question:   Manual Differential     Answer:   No       Outpatient Encounter Prescriptions as of 12/5/2017   Medication Sig Dispense Refill   • aspirin 81 MG EC tablet Take 81 mg by mouth Every Other Day.     • clindamycin (CLEOCIN T) 1 % lotion Apply  topically As Needed.     • Docusate Calcium (STOOL SOFTENER PO) Take  by mouth As Needed.     • indapamide (LOZOL) 1.25 MG tablet Take 1.25 mg by mouth Every Other Day. TAKE ONE TABLET BY MOUTH EVERY OTHER DAY.  Monday Wednesday AND FRIDAY     • losartan (COZAAR) 100 MG tablet Take 1 tablet by mouth Daily. 90 tablet 2   • omeprazole (PriLOSEC) 40 MG capsule Take 40 mg by mouth Daily.     • pravastatin (PRAVACHOL) 80 MG tablet TAKE 1 TABLET EVERY DAY 90 tablet 0   • spironolactone (ALDACTONE) 25 MG tablet Take 1 tablet by mouth Daily. 90 tablet 2   • Vitamin D, Cholecalciferol, 1000 units capsule Take 1,000 Units by mouth Daily.       No facility-administered encounter medications on file as of 12/5/2017.        Reviewed use of high risk medication in the elderly: yes  Reviewed for potential of harmful drug interactions in the elderly: yes    Follow Up:  Return in 6 months (on 6/5/2018).     An After Visit Summary and PPPS with all of these plans were given to the patient.       Follow-up with oncologist at [facility]

## 2017-12-05 NOTE — PATIENT INSTRUCTIONS
Exercising to Stay Healthy  Exercising regularly is important. It has many health benefits, such as:  · Improving your overall fitness, flexibility, and endurance.  · Increasing your bone density.  · Helping with weight control.  · Decreasing your body fat.  · Increasing your muscle strength.  · Reducing stress and tension.  · Improving your overall health.  In order to become healthy and stay healthy, it is recommended that you do moderate-intensity and vigorous-intensity exercise. You can tell that you are exercising at a moderate intensity if you have a higher heart rate and faster breathing, but you are still able to hold a conversation. You can tell that you are exercising at a vigorous intensity if you are breathing much harder and faster and cannot hold a conversation while exercising.  HOW OFTEN SHOULD I EXERCISE?  Choose an activity that you enjoy and set realistic goals. Your health care provider can help you to make an activity plan that works for you. Exercise regularly as directed by your health care provider. This may include:   · Doing resistance training twice each week, such as:    Push-ups.    Sit-ups.    Lifting weights.    Using resistance bands.  · Doing a given intensity of exercise for a given amount of time. Choose from these options:    150 minutes of moderate-intensity exercise every week.    75 minutes of vigorous-intensity exercise every week.    A mix of moderate-intensity and vigorous-intensity exercise every week.  Children, pregnant women, people who are out of shape, people who are overweight, and older adults may need to consult a health care provider for individual recommendations. If you have any sort of medical condition, be sure to consult your health care provider before starting a new exercise program.   WHAT ARE SOME EXERCISE IDEAS?  Some moderate-intensity exercise ideas include:   · Walking at a rate of 1 mile in 15  minutes.  · Biking.  · Hiking.  · Golfing.  · Dancing.  Some vigorous-intensity exercise ideas include:   · Walking at a rate of at least 4.5 miles per hour.  · Jogging or running at a rate of 5 miles per hour.  · Biking at a rate of at least 10 miles per hour.  · Lap swimming.  · Roller-skating or in-line skating.  · Cross-country skiing.  · Vigorous competitive sports, such as football, basketball, and soccer.  · Jumping rope.  · Aerobic dancing.  WHAT ARE SOME EVERYDAY ACTIVITIES THAT CAN HELP ME TO GET EXERCISE?  · Yard work, such as:    Pushing a .    Raking and bagging leaves.  · Washing and waxing your car.  · Pushing a stroller.  · Shoveling snow.  · Gardening.  · Washing windows or floors.  HOW CAN I BE MORE ACTIVE IN MY DAY-TO-DAY ACTIVITIES?  · Use the stairs instead of the elevator.  · Take a walk during your lunch break.  · If you drive, park your car farther away from work or school.  · If you take public transportation, get off one stop early and walk the rest of the way.  · Make all of your phone calls while standing up and walking around.  · Get up, stretch, and walk around every 30 minutes throughout the day.  WHAT GUIDELINES SHOULD I FOLLOW WHILE EXERCISING?  · Do not exercise so much that you hurt yourself, feel dizzy, or get very short of breath.  · Consult your health care provider before starting a new exercise program.  · Wear comfortable clothes and shoes with good support.  · Drink plenty of water while you exercise to prevent dehydration or heat stroke. Body water is lost during exercise and must be replaced.  · Work out until you breathe faster and your heart beats faster.     This information is not intended to replace advice given to you by your health care provider. Make sure you discuss any questions you have with your health care provider.     Document Released: 01/20/2012 Document Revised: 01/08/2016 Document Reviewed: 05/21/2015  [employer] Patient Education  ©2017 [employer]    Fall Prevention in the Home   Falls can cause injuries and can affect people from all age groups. There are many simple things that you can do to make your home safe and to help prevent falls.  WHAT CAN I DO ON THE OUTSIDE OF MY HOME?  · Regularly repair the edges of walkways and driveways and fix any cracks.  · Remove high doorway thresholds.  · Trim any shrubbery on the main path into your home.  · Use bright outdoor lighting.  · Clear walkways of debris and clutter, including tools and rocks.  · Regularly check that handrails are securely fastened and in good repair. Both sides of any steps should have handrails.  · Install guardrails along the edges of any raised decks or porches.  · Have leaves, snow, and ice cleared regularly.  · Use sand or salt on walkways during winter months.  · In the garage, clean up any spills right away, including grease or oil spills.  WHAT CAN I DO IN THE BATHROOM?  · Use night lights.  · Install grab bars by the toilet and in the tub and shower. Do not use towel bars as grab bars.  · Use non-skid mats or decals on the floor of the tub or shower.  · If you need to sit down while you are in the shower, use a plastic, non-slip stool.  · Keep the floor dry. Immediately clean up any water that spills on the floor.  · Remove soap buildup in the tub or shower on a regular basis.  · Attach bath mats securely with double-sided non-slip rug tape.  · Remove throw rugs and other tripping hazards from the floor.  WHAT CAN I DO IN THE BEDROOM?  · Use night lights.  · Make sure that a bedside light is easy to reach.  · Do not use oversized bedding that drapes onto the floor.  · Have a firm chair that has side arms to use for getting dressed.  · Remove throw rugs and other tripping hazards from the floor.  WHAT CAN I DO IN THE KITCHEN?   · Clean up any spills right away.  · Avoid walking on wet floors.  · Place frequently used items in easy-to-reach places.  · If you need to  reach for something above you, use a sturdy step stool that has a grab bar.  · Keep electrical cables out of the way.  · Do not use floor polish or wax that makes floors slippery. If you have to use wax, make sure that it is non-skid floor wax.  · Remove throw rugs and other tripping hazards from the floor.  WHAT CAN I DO IN THE STAIRWAYS?  · Do not leave any items on the stairs.  · Make sure that there are handrails on both sides of the stairs. Fix handrails that are broken or loose. Make sure that handrails are as long as the stairways.  · Check any carpeting to make sure that it is firmly attached to the stairs. Fix any carpet that is loose or worn.  · Avoid having throw rugs at the top or bottom of stairways, or secure the rugs with carpet tape to prevent them from moving.  · Make sure that you have a light switch at the top of the stairs and the bottom of the stairs. If you do not have them, have them installed.  WHAT ARE SOME OTHER FALL PREVENTION TIPS?  · Wear closed-toe shoes that fit well and support your feet. Wear shoes that have rubber soles or low heels.  · When you use a stepladder, make sure that it is completely opened and that the sides are firmly locked. Have someone hold the ladder while you are using it. Do not climb a closed stepladder.  · Add color or contrast paint or tape to grab bars and handrails in your home. Place contrasting color strips on the first and last steps.  · Use mobility aids as needed, such as canes, walkers, scooters, and crutches.  · Turn on lights if it is dark. Replace any light bulbs that burn out.  · Set up furniture so that there are clear paths. Keep the furniture in the same spot.  · Fix any uneven floor surfaces.  · Choose a carpet design that does not hide the edge of steps of a stairway.  · Be aware of any and all pets.  · Review your medicines with your healthcare provider. Some medicines can cause dizziness or changes in blood pressure, which increase your risk  of falling.  Talk with your health care provider about other ways that you can decrease your risk of falls. This may include working with a physical therapist or  to improve your strength, balance, and endurance.     This information is not intended to replace advice given to you by your health care provider. Make sure you discuss any questions you have with your health care provider.     Document Released: 2003 Document Revised: 04/10/2017 Document Reviewed: 2016  [employer] Interactive Patient Education © [employer]    Medicare Wellness  Personal Prevention Plan of Service     Date of Office Visit:  2017  Encounter Provider:  Lee Akins MD  Place of Service:  [facility]  Patient Name: Ezekiel Lynch  :  1934    As part of the Medicare Wellness portion of your visit today, we are providing you with this personalized preventive plan of services (PPPS). This plan is based upon recommendations of the [place] Preventive Services Task Force (USPSTF) and the Advisory Committee on Immunization Practices (ACIP).    This lists the preventive care services that should be considered, and provides dates of when you are due. Items listed as completed are up-to-date and do not require any further intervention.    Health Maintenance   Topic Date Due   • TDAP/TD VACCINES (2 - Td) 2017   • MEDICARE ANNUAL WELLNESS  2017   • LIPID PANEL  2017   • INFLUENZA VACCINE  Completed   • PNEUMOCOCCAL VACCINES (65+ LOW/MEDIUM RISK)  Completed   • ZOSTER VACCINE  Completed       No orders of the defined types were placed in this encounter.      Return in 6 months (on 2018).

## 2017-12-07 RX ORDER — PRAVASTATIN SODIUM 80 MG/1
TABLET ORAL
Qty: 90 TABLET | Refills: 3 | Status: SHIPPED | OUTPATIENT
Start: 2017-12-07 | End: 2018-12-18 | Stop reason: SDUPTHER

## 2017-12-07 RX ORDER — LOSARTAN POTASSIUM 100 MG/1
TABLET ORAL
Qty: 90 TABLET | Refills: 3 | Status: SHIPPED | OUTPATIENT
Start: 2017-12-07 | End: 2018-12-18 | Stop reason: SDUPTHER

## 2017-12-08 ENCOUNTER — OFFICE VISIT (OUTPATIENT)
Dept: FAMILY MEDICINE CLINIC | Facility: CLINIC | Age: 82
End: 2017-12-08

## 2017-12-08 VITALS
BODY MASS INDEX: 21.26 KG/M2 | SYSTOLIC BLOOD PRESSURE: 128 MMHG | HEIGHT: 63 IN | DIASTOLIC BLOOD PRESSURE: 78 MMHG | WEIGHT: 120 LBS

## 2017-12-08 DIAGNOSIS — M79.605 PAIN OF LEFT LOWER EXTREMITY: Primary | ICD-10-CM

## 2017-12-08 PROCEDURE — 99213 OFFICE O/P EST LOW 20 MIN: CPT | Performed by: FAMILY MEDICINE

## 2017-12-08 NOTE — PROGRESS NOTES
Subjective   Ezekiel Lynch is a 83 y.o. female.     Leg Pain    The incident occurred 6 to 12 hours ago. The incident occurred at home. There was no injury mechanism. The pain is present in the left leg. The quality of the pain is described as cramping. The pain is at a severity of 2/10. The pain is mild. The pain has been fluctuating since onset. She reports no foreign bodies present. Nothing aggravates the symptoms. She has tried nothing for the symptoms.       The following portions of the patient's history were reviewed and updated as appropriate: allergies, current medications, past family history, past medical history, past social history, past surgical history and problem list.    Review of Systems   Musculoskeletal: Negative for back pain and joint swelling.   Neurological: Negative for light-headedness and headaches.       Objective   Physical Exam   Constitutional: She is oriented to person, place, and time. She appears well-developed and well-nourished.   Musculoskeletal: She exhibits no edema or tenderness.   Left mid thigh cramping-no tenderness no cords no redness no warmth   Neurological: She is alert and oriented to person, place, and time. She has normal reflexes.   No focal weakness   Psychiatric: She has a normal mood and affect. Her behavior is normal. Judgment and thought content normal.   Nursing note and vitals reviewed.      Assessment/Plan   Ezekiel was seen today for leg pain.    Diagnoses and all orders for this visit:    Pain of left lower extremity       Plan-he ice combo observation

## 2018-01-19 ENCOUNTER — TELEPHONE (OUTPATIENT)
Dept: FAMILY MEDICINE CLINIC | Facility: CLINIC | Age: 83
End: 2018-01-19

## 2018-01-19 LAB
EXPIRATION DATE 2: NORMAL
EXPIRATION DATE 3: NORMAL
EXPIRATION DATE: NORMAL
GASTROCULT GAST QL: NEGATIVE
HEMOCCULT SP2 STL QL: NEGATIVE
HEMOCCULT SP3 STL QL: NEGATIVE
Lab: NORMAL

## 2018-01-19 PROCEDURE — 82270 OCCULT BLOOD FECES: CPT | Performed by: FAMILY MEDICINE

## 2018-02-27 ENCOUNTER — OFFICE VISIT (OUTPATIENT)
Dept: FAMILY MEDICINE CLINIC | Facility: CLINIC | Age: 83
End: 2018-02-27

## 2018-02-27 VITALS
WEIGHT: 119 LBS | SYSTOLIC BLOOD PRESSURE: 118 MMHG | BODY MASS INDEX: 21.09 KG/M2 | HEART RATE: 70 BPM | OXYGEN SATURATION: 98 % | HEIGHT: 63 IN | TEMPERATURE: 97.6 F | DIASTOLIC BLOOD PRESSURE: 62 MMHG

## 2018-02-27 DIAGNOSIS — F41.9 ANXIETY: Primary | ICD-10-CM

## 2018-02-27 PROCEDURE — 99213 OFFICE O/P EST LOW 20 MIN: CPT | Performed by: FAMILY MEDICINE

## 2018-02-27 RX ORDER — HYDROXYZINE HYDROCHLORIDE 10 MG/1
TABLET, FILM COATED ORAL
Qty: 90 TABLET | Refills: 2 | Status: SHIPPED | OUTPATIENT
Start: 2018-02-27 | End: 2018-04-17

## 2018-02-27 NOTE — PROGRESS NOTES
Subjective   Ezekiel Lynch is a 83 y.o. female.     Anxiety   Presents for follow-up visit. Symptoms include depressed mood, excessive worry, insomnia and nervous/anxious behavior. Patient reports no chest pain. Symptoms occur most days. The severity of symptoms is moderate. The quality of sleep is fair. Nighttime awakenings: one to two.     Side effects of treatment include GI discomfort.       The following portions of the patient's history were reviewed and updated as appropriate: allergies, current medications, past family history, past medical history, past social history, past surgical history and problem list.    Review of Systems   Cardiovascular: Negative for chest pain.   Neurological: Negative for headaches.   Psychiatric/Behavioral: Positive for dysphoric mood. The patient is nervous/anxious and has insomnia.        Objective   Physical Exam   Constitutional: She is oriented to person, place, and time. She appears well-developed and well-nourished.   Neurological: She is alert and oriented to person, place, and time.   Psychiatric: She has a normal mood and affect. Her behavior is normal. Judgment and thought content normal.   Nursing note and vitals reviewed.      Assessment/Plan   Ezekiel was seen today for stress.    Diagnoses and all orders for this visit:    Anxiety    Other orders  -     hydrOXYzine (ATARAX) 10 MG tablet; One or 2 every 6-8 hours when necessary anxiety         Above plus CBT, mindfulness therapy discussed-will probably antidepressant

## 2018-03-27 ENCOUNTER — TELEPHONE (OUTPATIENT)
Dept: FAMILY MEDICINE CLINIC | Facility: CLINIC | Age: 83
End: 2018-03-27

## 2018-03-27 NOTE — TELEPHONE ENCOUNTER
Patient has not been able to find Vitamin E 1000.  Is there another strength she can take.  Please advise

## 2018-04-17 ENCOUNTER — OFFICE VISIT (OUTPATIENT)
Dept: FAMILY MEDICINE CLINIC | Facility: CLINIC | Age: 83
End: 2018-04-17

## 2018-04-17 VITALS
SYSTOLIC BLOOD PRESSURE: 106 MMHG | HEART RATE: 79 BPM | WEIGHT: 121.2 LBS | TEMPERATURE: 97.8 F | OXYGEN SATURATION: 99 % | DIASTOLIC BLOOD PRESSURE: 62 MMHG | BODY MASS INDEX: 21.48 KG/M2 | HEIGHT: 63 IN

## 2018-04-17 DIAGNOSIS — M25.552 PAIN OF LEFT HIP JOINT: ICD-10-CM

## 2018-04-17 DIAGNOSIS — M54.16 LEFT LUMBAR RADICULOPATHY: Primary | ICD-10-CM

## 2018-04-17 PROCEDURE — 99213 OFFICE O/P EST LOW 20 MIN: CPT | Performed by: FAMILY MEDICINE

## 2018-04-17 NOTE — PROGRESS NOTES
Subjective   Ezekiel Lynch is a 83 y.o. female.     Leg Pain    The incident occurred more than 1 week ago. The incident occurred at home. There was no injury mechanism. The pain is present in the left leg. The quality of the pain is described as shooting and stabbing. The pain is moderate. The pain has been intermittent since onset. Associated symptoms include an inability to bear weight. She reports no foreign bodies present. Nothing aggravates the symptoms. She has tried nothing for the symptoms.       The following portions of the patient's history were reviewed and updated as appropriate: allergies, current medications, past family history, past medical history, past social history, past surgical history and problem list.    Review of Systems   Genitourinary:        History of breast cancer   Musculoskeletal: Negative for back pain, gait problem and joint swelling.       Objective   Physical Exam   Constitutional: She is oriented to person, place, and time. She appears well-developed and well-nourished.   Musculoskeletal: She exhibits no edema, tenderness or deformity.   Left thigh no soft tissue nodule or mass felt warm comparable to the right thigh   Neurological: She is alert and oriented to person, place, and time. She has normal reflexes.   No dorsal spinous process tenderness to percussion   Skin: Skin is warm and dry. Capillary refill takes less than 2 seconds.   Psychiatric: She has a normal mood and affect. Her behavior is normal. Judgment and thought content normal.   Nursing note and vitals reviewed.      Assessment/Plan   There are no diagnoses linked to this encounter.          plan-above-if x-rays unrevealing will need nerve conduction study of left leg-history of breast cancer

## 2018-04-20 DIAGNOSIS — M25.552 PAIN OF LEFT HIP JOINT: ICD-10-CM

## 2018-04-20 DIAGNOSIS — M54.16 LEFT LUMBAR RADICULOPATHY: ICD-10-CM

## 2018-04-24 ENCOUNTER — OFFICE VISIT (OUTPATIENT)
Dept: FAMILY MEDICINE CLINIC | Facility: CLINIC | Age: 83
End: 2018-04-24

## 2018-04-24 VITALS
TEMPERATURE: 97.6 F | HEART RATE: 66 BPM | OXYGEN SATURATION: 98 % | HEIGHT: 63 IN | BODY MASS INDEX: 21.33 KG/M2 | WEIGHT: 120.4 LBS | DIASTOLIC BLOOD PRESSURE: 72 MMHG | SYSTOLIC BLOOD PRESSURE: 120 MMHG

## 2018-04-24 DIAGNOSIS — M79.89 SOFT TISSUE MASS: ICD-10-CM

## 2018-04-24 DIAGNOSIS — R52 PAIN: Primary | ICD-10-CM

## 2018-04-24 PROCEDURE — 99214 OFFICE O/P EST MOD 30 MIN: CPT | Performed by: FAMILY MEDICINE

## 2018-04-24 NOTE — PROGRESS NOTES
Subjective   Ezekiel Lynch is a 83 y.o. female.     Leg Pain    The incident occurred more than 1 week ago. The incident occurred at home. There was no injury mechanism. The pain is present in the left thigh. The pain is mild. The pain has been fluctuating since onset. Associated symptoms comments: Soft tissue mass medial thigh consistent with a tissue-4 cm in size. She has tried nothing for the symptoms.       The following portions of the patient's history were reviewed and updated as appropriate: allergies, current medications, past family history, past medical history, past social history, past surgical history and problem list.    Review of Systems   Cardiovascular: Negative for leg swelling.   Musculoskeletal:        Soft tissue mass left inner thigh   Neurological:        Intermittent neurogenic pain left thigh probably related to soft tissue mass       Objective   Physical Exam   Constitutional: She is oriented to person, place, and time. She appears well-developed and well-nourished.   Musculoskeletal: She exhibits tenderness.   Proxy 4 cm left thigh soft tissue mass consistent with fatty tissue   Neurological: She is alert and oriented to person, place, and time.   Skin: Skin is warm and dry.   Psychiatric: She has a normal mood and affect. Her behavior is normal. Judgment and thought content normal.   Nursing note and vitals reviewed.      Assessment/Plan   Ezekiel was seen today for leg pain.    Diagnoses and all orders for this visit:    Pain           Probable lipoma-rule out liposarcoma-MR with and without contrast left thigh

## 2018-05-01 ENCOUNTER — RESULTS ENCOUNTER (OUTPATIENT)
Dept: FAMILY MEDICINE CLINIC | Facility: CLINIC | Age: 83
End: 2018-05-01

## 2018-05-01 DIAGNOSIS — M79.89 SOFT TISSUE MASS: ICD-10-CM

## 2018-05-03 DIAGNOSIS — M79.89 SOFT TISSUE MASS: ICD-10-CM

## 2018-05-23 ENCOUNTER — TELEPHONE (OUTPATIENT)
Dept: FAMILY MEDICINE CLINIC | Facility: CLINIC | Age: 83
End: 2018-05-23

## 2018-05-23 NOTE — TELEPHONE ENCOUNTER
Inside of mouth is sore, tongue sore and burning x 3 days.  Doesn't know if due to something she has eaten.  Has been eating a lot of strawberries.  Not sure what she needs to do for it.          Biotene mouthwash 4 times a day

## 2018-07-30 ENCOUNTER — OFFICE VISIT (OUTPATIENT)
Dept: FAMILY MEDICINE CLINIC | Facility: CLINIC | Age: 83
End: 2018-07-30

## 2018-07-30 VITALS
HEART RATE: 69 BPM | DIASTOLIC BLOOD PRESSURE: 62 MMHG | SYSTOLIC BLOOD PRESSURE: 106 MMHG | OXYGEN SATURATION: 97 % | WEIGHT: 120 LBS | TEMPERATURE: 97.7 F | HEIGHT: 63 IN | BODY MASS INDEX: 21.26 KG/M2

## 2018-07-30 DIAGNOSIS — K57.92 DIVERTICULITIS OF INTESTINE WITHOUT PERFORATION OR ABSCESS WITHOUT BLEEDING, UNSPECIFIED PART OF INTESTINAL TRACT: Primary | ICD-10-CM

## 2018-07-30 LAB
BILIRUB BLD-MCNC: NEGATIVE MG/DL
CLARITY, POC: CLEAR
COLOR UR: YELLOW
GLUCOSE UR STRIP-MCNC: NEGATIVE MG/DL
KETONES UR QL: NEGATIVE
LEUKOCYTE EST, POC: NEGATIVE
NITRITE UR-MCNC: NEGATIVE MG/ML
PH UR: 6 [PH] (ref 5–8)
PROT UR STRIP-MCNC: NEGATIVE MG/DL
RBC # UR STRIP: NEGATIVE /UL
SP GR UR: 1 (ref 1–1.03)
UROBILINOGEN UR QL: NORMAL

## 2018-07-30 PROCEDURE — 99214 OFFICE O/P EST MOD 30 MIN: CPT | Performed by: FAMILY MEDICINE

## 2018-07-30 PROCEDURE — 81003 URINALYSIS AUTO W/O SCOPE: CPT | Performed by: FAMILY MEDICINE

## 2018-07-30 RX ORDER — AMOXICILLIN 500 MG/1
500 CAPSULE ORAL 3 TIMES DAILY
Qty: 21 CAPSULE | Refills: 0 | Status: SHIPPED | OUTPATIENT
Start: 2018-07-30 | End: 2018-08-06

## 2018-07-30 NOTE — PROGRESS NOTES
Subjective   Ezekiel Lynch is a 83 y.o. female.     Diverticulitis   This is a recurrent problem. The current episode started in the past 7 days. The problem occurs daily. The problem has been waxing and waning. Associated symptoms include abdominal pain, fatigue and a fever. Nothing aggravates the symptoms. She has tried nothing for the symptoms.       The following portions of the patient's history were reviewed and updated as appropriate: allergies, current medications, past family history, past medical history, past social history, past surgical history and problem list.    Review of Systems   Constitutional: Positive for fatigue and fever.   Gastrointestinal: Positive for abdominal pain.       Objective   Physical Exam   Constitutional: She is oriented to person, place, and time. She appears well-developed and well-nourished.   Cardiovascular: Normal rate and regular rhythm.    Pulmonary/Chest: Effort normal and breath sounds normal.   Abdominal: She exhibits no mass. There is tenderness. There is no guarding.   Musculoskeletal: She exhibits no edema.   Neurological: She is alert and oriented to person, place, and time.   Skin: Skin is warm and dry.   Psychiatric: She has a normal mood and affect. Her behavior is normal. Judgment and thought content normal.   Nursing note and vitals reviewed.      Assessment/Plan   Ezekiel was seen today for diverticulitis.    Diagnoses and all orders for this visit:    Diverticulitis of intestine without perforation or abscess without bleeding, unspecified part of intestinal tract  -     POC Urinalysis Dipstick, Automated  -     CBC & Differential  -     Comprehensive Metabolic Panel    Other orders  -     amoxicillin (AMOXIL) 500 MG capsule; Take 1 capsule by mouth 3 (Three) Times a Day for 7 days.           Plan-above

## 2018-07-31 LAB
ALBUMIN SERPL-MCNC: 4.3 G/DL (ref 3.5–5)
ALBUMIN/GLOB SERPL: 1.6 G/DL (ref 1.1–2.5)
ALP SERPL-CCNC: 73 U/L (ref 24–120)
ALT SERPL-CCNC: 30 U/L (ref 0–54)
AST SERPL-CCNC: 32 U/L (ref 7–45)
BASOPHILS # BLD AUTO: 0.06 10*3/MM3 (ref 0–0.2)
BASOPHILS NFR BLD AUTO: 0.9 % (ref 0–2)
BILIRUB SERPL-MCNC: 0.4 MG/DL (ref 0.1–1)
BUN SERPL-MCNC: 14 MG/DL (ref 5–21)
BUN/CREAT SERPL: 18.2 (ref 7–25)
CALCIUM SERPL-MCNC: 9.7 MG/DL (ref 8.4–10.4)
CHLORIDE SERPL-SCNC: 99 MMOL/L (ref 98–110)
CO2 SERPL-SCNC: 24 MMOL/L (ref 24–31)
CREAT SERPL-MCNC: 0.77 MG/DL (ref 0.5–1.4)
EOSINOPHIL # BLD AUTO: 0.1 10*3/MM3 (ref 0–0.7)
EOSINOPHIL NFR BLD AUTO: 1.5 % (ref 0–4)
ERYTHROCYTE [DISTWIDTH] IN BLOOD BY AUTOMATED COUNT: 12.1 % (ref 12–15)
GLOBULIN SER CALC-MCNC: 2.7 GM/DL
GLUCOSE SERPL-MCNC: 104 MG/DL (ref 70–100)
HCT VFR BLD AUTO: 39.5 % (ref 37–47)
HGB BLD-MCNC: 12.6 G/DL (ref 12–16)
IMM GRANULOCYTES # BLD: 0.04 10*3/MM3 (ref 0–0.03)
IMM GRANULOCYTES NFR BLD: 0.6 % (ref 0–5)
LYMPHOCYTES # BLD AUTO: 2.83 10*3/MM3 (ref 0.72–4.86)
LYMPHOCYTES NFR BLD AUTO: 43.7 % (ref 15–45)
MCH RBC QN AUTO: 28.4 PG (ref 28–32)
MCHC RBC AUTO-ENTMCNC: 31.9 G/DL (ref 33–36)
MCV RBC AUTO: 89 FL (ref 82–98)
MONOCYTES # BLD AUTO: 0.69 10*3/MM3 (ref 0.19–1.3)
MONOCYTES NFR BLD AUTO: 10.7 % (ref 4–12)
NEUTROPHILS # BLD AUTO: 2.75 10*3/MM3 (ref 1.87–8.4)
NEUTROPHILS NFR BLD AUTO: 42.6 % (ref 39–78)
NRBC BLD AUTO-RTO: 0 /100 WBC (ref 0–0)
PLATELET # BLD AUTO: 319 10*3/MM3 (ref 130–400)
POTASSIUM SERPL-SCNC: 4.1 MMOL/L (ref 3.5–5.3)
PROT SERPL-MCNC: 7 G/DL (ref 6.3–8.7)
RBC # BLD AUTO: 4.44 10*6/MM3 (ref 4.2–5.4)
SODIUM SERPL-SCNC: 135 MMOL/L (ref 135–145)
WBC # BLD AUTO: 6.47 10*3/MM3 (ref 4.8–10.8)

## 2018-08-27 ENCOUNTER — OFFICE VISIT (OUTPATIENT)
Dept: FAMILY MEDICINE CLINIC | Facility: CLINIC | Age: 83
End: 2018-08-27

## 2018-08-27 VITALS
HEIGHT: 63 IN | WEIGHT: 121.6 LBS | HEART RATE: 82 BPM | DIASTOLIC BLOOD PRESSURE: 70 MMHG | OXYGEN SATURATION: 98 % | BODY MASS INDEX: 21.55 KG/M2 | TEMPERATURE: 98 F | SYSTOLIC BLOOD PRESSURE: 128 MMHG

## 2018-08-27 DIAGNOSIS — R10.32 LEFT LOWER QUADRANT PAIN: Primary | ICD-10-CM

## 2018-08-27 LAB
BASOPHILS # BLD AUTO: 0.05 10*3/MM3 (ref 0–0.2)
BASOPHILS NFR BLD AUTO: 0.7 % (ref 0–2)
BUN SERPL-MCNC: 12 MG/DL (ref 5–21)
BUN/CREAT SERPL: 15.8 (ref 7–25)
CALCIUM SERPL-MCNC: 9.6 MG/DL (ref 8.4–10.4)
CHLORIDE SERPL-SCNC: 98 MMOL/L (ref 98–110)
CO2 SERPL-SCNC: 27 MMOL/L (ref 24–31)
CREAT SERPL-MCNC: 0.76 MG/DL (ref 0.5–1.4)
EOSINOPHIL # BLD AUTO: 0.05 10*3/MM3 (ref 0–0.7)
EOSINOPHIL NFR BLD AUTO: 0.7 % (ref 0–4)
ERYTHROCYTE [DISTWIDTH] IN BLOOD BY AUTOMATED COUNT: 12.2 % (ref 12–15)
GLUCOSE SERPL-MCNC: 132 MG/DL (ref 70–100)
HCT VFR BLD AUTO: 36.9 % (ref 37–47)
HGB BLD-MCNC: 12.2 G/DL (ref 12–16)
IMM GRANULOCYTES # BLD: 0.06 10*3/MM3 (ref 0–0.03)
IMM GRANULOCYTES NFR BLD: 0.8 % (ref 0–5)
LYMPHOCYTES # BLD AUTO: 2.67 10*3/MM3 (ref 0.72–4.86)
LYMPHOCYTES NFR BLD AUTO: 37.4 % (ref 15–45)
MCH RBC QN AUTO: 29.8 PG (ref 28–32)
MCHC RBC AUTO-ENTMCNC: 33.1 G/DL (ref 33–36)
MCV RBC AUTO: 90 FL (ref 82–98)
MONOCYTES # BLD AUTO: 0.57 10*3/MM3 (ref 0.19–1.3)
MONOCYTES NFR BLD AUTO: 8 % (ref 4–12)
NEUTROPHILS # BLD AUTO: 3.74 10*3/MM3 (ref 1.87–8.4)
NEUTROPHILS NFR BLD AUTO: 52.4 % (ref 39–78)
NRBC BLD AUTO-RTO: 0 /100 WBC (ref 0–0)
PLATELET # BLD AUTO: 261 10*3/MM3 (ref 130–400)
POTASSIUM SERPL-SCNC: 3.9 MMOL/L (ref 3.5–5.3)
RBC # BLD AUTO: 4.1 10*6/MM3 (ref 4.2–5.4)
SODIUM SERPL-SCNC: 137 MMOL/L (ref 135–145)
WBC # BLD AUTO: 7.14 10*3/MM3 (ref 4.8–10.8)

## 2018-08-27 PROCEDURE — 99213 OFFICE O/P EST LOW 20 MIN: CPT | Performed by: FAMILY MEDICINE

## 2018-08-27 NOTE — PROGRESS NOTES
Subjective   Ezekiel Lynch is a 83 y.o. female.     Diverticulitis   This is a recurrent problem. The current episode started in the past 7 days. The problem occurs daily. The problem has been gradually improving. Associated symptoms include abdominal pain, chills and fatigue. The symptoms are aggravated by eating. She has tried nothing for the symptoms.       The following portions of the patient's history were reviewed and updated as appropriate: allergies, current medications, past family history, past medical history, past social history, past surgical history and problem list.    Review of Systems   Constitutional: Positive for chills and fatigue.   Gastrointestinal: Positive for abdominal pain.       Objective   Physical Exam   Constitutional: She is oriented to person, place, and time. She appears well-developed and well-nourished.   Cardiovascular: Normal rate and regular rhythm.    Pulmonary/Chest: Effort normal and breath sounds normal.   Abdominal: She exhibits no mass. There is tenderness.   Left lower quadrant   Neurological: She is alert and oriented to person, place, and time.   Psychiatric: She has a normal mood and affect. Her behavior is normal. Judgment and thought content normal.   Nursing note and vitals reviewed.      Assessment/Plan   Ezekiel was seen today for diverticulitis.    Diagnoses and all orders for this visit:    Left lower quadrant pain  -     CBC & Differential  -     Basic Metabolic Panel         Plan CT lab work, yeni may need colonoscopy

## 2018-08-28 ENCOUNTER — TELEPHONE (OUTPATIENT)
Dept: FAMILY MEDICINE CLINIC | Facility: CLINIC | Age: 83
End: 2018-08-28

## 2018-08-28 DIAGNOSIS — R10.32 LEFT LOWER QUADRANT PAIN: ICD-10-CM

## 2018-08-28 RX ORDER — METRONIDAZOLE 500 MG/1
500 TABLET ORAL 2 TIMES DAILY
Qty: 14 TABLET | Refills: 0 | Status: SHIPPED | OUTPATIENT
Start: 2018-08-28 | End: 2018-09-04

## 2018-08-28 RX ORDER — CIPROFLOXACIN 500 MG/1
500 TABLET, FILM COATED ORAL 2 TIMES DAILY
Qty: 14 TABLET | Refills: 0 | Status: SHIPPED | OUTPATIENT
Start: 2018-08-28 | End: 2018-09-04

## 2018-08-28 NOTE — TELEPHONE ENCOUNTER
PER MD-XRAY SHOWED DIVERTICULITIS-PT WAS ADVISED-MD SENDING IN ANTIBIOTIC & APPT FOR 09.07.18 FOR F/U.

## 2018-08-31 ENCOUNTER — TELEPHONE (OUTPATIENT)
Dept: FAMILY MEDICINE CLINIC | Facility: CLINIC | Age: 83
End: 2018-08-31

## 2018-09-07 ENCOUNTER — OFFICE VISIT (OUTPATIENT)
Dept: FAMILY MEDICINE CLINIC | Facility: CLINIC | Age: 83
End: 2018-09-07

## 2018-09-07 VITALS
WEIGHT: 119.8 LBS | DIASTOLIC BLOOD PRESSURE: 75 MMHG | SYSTOLIC BLOOD PRESSURE: 147 MMHG | OXYGEN SATURATION: 96 % | HEIGHT: 63 IN | TEMPERATURE: 98.5 F | BODY MASS INDEX: 21.23 KG/M2

## 2018-09-07 DIAGNOSIS — K57.32 DIVERTICULITIS OF LARGE INTESTINE WITHOUT PERFORATION OR ABSCESS WITHOUT BLEEDING: Primary | ICD-10-CM

## 2018-09-07 PROCEDURE — 99213 OFFICE O/P EST LOW 20 MIN: CPT | Performed by: FAMILY MEDICINE

## 2018-09-07 NOTE — PROGRESS NOTES
Subjective   Ezekiel Lynch is a 83 y.o. female.     Pain   This is a recurrent problem. The current episode started 1 to 4 weeks ago. The problem occurs daily. The problem has been resolved. Pertinent negatives include no abdominal pain, chills or fever. Nothing aggravates the symptoms. Treatments tried: Antibiotics and diet change.       The following portions of the patient's history were reviewed and updated as appropriate: allergies, current medications, past family history, past medical history, past social history, past surgical history and problem list.    Review of Systems   Constitutional: Negative for chills and fever.   Gastrointestinal: Negative for abdominal pain and diarrhea.       Objective   Physical Exam   Constitutional: She is oriented to person, place, and time. She appears well-developed and well-nourished.   HENT:   Nose: Nose normal.   Mouth/Throat: Oropharynx is clear and moist.   Cardiovascular: Normal rate and regular rhythm.    Pulmonary/Chest: Effort normal and breath sounds normal.   Abdominal: Soft. There is no tenderness. There is no guarding.   Musculoskeletal: She exhibits no edema.   Lymphadenopathy:     She has no cervical adenopathy.   Neurological: She is alert and oriented to person, place, and time.   Psychiatric: She has a normal mood and affect. Her behavior is normal. Judgment and thought content normal.   Nursing note and vitals reviewed.      Assessment/Plan   Ezekiel was seen today for follow-up.    Diagnoses and all orders for this visit:    Diverticulitis of large intestine without perforation or abscess without bleeding         Plan-finish antibiotics-yogurt for a month-low threshold for colonoscopy-she understands

## 2018-10-15 ENCOUNTER — FLU SHOT (OUTPATIENT)
Dept: FAMILY MEDICINE CLINIC | Facility: CLINIC | Age: 83
End: 2018-10-15

## 2018-10-15 PROCEDURE — 90662 IIV NO PRSV INCREASED AG IM: CPT | Performed by: FAMILY MEDICINE

## 2018-10-15 PROCEDURE — G0008 ADMIN INFLUENZA VIRUS VAC: HCPCS | Performed by: FAMILY MEDICINE

## 2018-11-12 ENCOUNTER — OFFICE VISIT (OUTPATIENT)
Dept: FAMILY MEDICINE CLINIC | Facility: CLINIC | Age: 83
End: 2018-11-12

## 2018-11-12 VITALS
HEART RATE: 76 BPM | WEIGHT: 121 LBS | OXYGEN SATURATION: 97 % | DIASTOLIC BLOOD PRESSURE: 77 MMHG | TEMPERATURE: 97.7 F | HEIGHT: 63 IN | BODY MASS INDEX: 21.44 KG/M2 | SYSTOLIC BLOOD PRESSURE: 160 MMHG

## 2018-11-12 DIAGNOSIS — E87.1 HYPONATREMIA: Primary | ICD-10-CM

## 2018-11-12 LAB
BUN SERPL-MCNC: 14 MG/DL (ref 5–21)
BUN/CREAT SERPL: 19.7 (ref 7–25)
CALCIUM SERPL-MCNC: 10.2 MG/DL (ref 8.4–10.4)
CHLORIDE SERPL-SCNC: 94 MMOL/L (ref 98–110)
CO2 SERPL-SCNC: 27 MMOL/L (ref 24–31)
CREAT SERPL-MCNC: 0.71 MG/DL (ref 0.5–1.4)
GLUCOSE SERPL-MCNC: 91 MG/DL (ref 70–100)
POTASSIUM SERPL-SCNC: 4.5 MMOL/L (ref 3.5–5.3)
SODIUM SERPL-SCNC: 133 MMOL/L (ref 135–145)

## 2018-11-12 PROCEDURE — 99213 OFFICE O/P EST LOW 20 MIN: CPT | Performed by: FAMILY MEDICINE

## 2018-11-12 NOTE — PROGRESS NOTES
Subjective   Ezekiel Lynch is a 84 y.o. female.     Dizziness   This is a recurrent problem. The current episode started in the past 7 days. The problem occurs intermittently. The problem has been gradually improving. Pertinent negatives include no chest pain or headaches. Nothing aggravates the symptoms. She has tried nothing for the symptoms.       The following portions of the patient's history were reviewed and updated as appropriate: allergies, current medications, past family history, past medical history, past social history, past surgical history and problem list.    Review of Systems   Cardiovascular: Negative for chest pain.        Blood pressure elevation   Neurological: Positive for dizziness. Negative for headaches.       Objective   Physical Exam   Constitutional: She is oriented to person, place, and time.   Neck:   The carotid pulses are normal   Cardiovascular: Normal rate and regular rhythm.   Pulmonary/Chest: Effort normal and breath sounds normal.   Neurological: She is alert and oriented to person, place, and time.   Psychiatric: She has a normal mood and affect. Her behavior is normal. Judgment and thought content normal.   Nursing note and vitals reviewed.      Assessment/Plan   Ezekiel was seen today for dizziness.    Diagnoses and all orders for this visit:    Hyponatremia  -     Basic Metabolic Panel         Plan take spironolactone every day-see back one month for annual physical

## 2018-11-26 RX ORDER — SPIRONOLACTONE 25 MG/1
TABLET ORAL
Qty: 90 TABLET | Refills: 0 | Status: SHIPPED | OUTPATIENT
Start: 2018-11-26 | End: 2019-02-20 | Stop reason: SDUPTHER

## 2018-11-27 RX ORDER — MECLIZINE HCL 12.5 MG/1
TABLET ORAL
Qty: 60 TABLET | Refills: 1 | Status: SHIPPED | OUTPATIENT
Start: 2018-11-27 | End: 2019-01-04

## 2018-11-27 NOTE — TELEPHONE ENCOUNTER
STILL C/O UNSTEADINESS WHILE STANDING/WALKING. DIZZINESS.  BP RUNNING OK-TAKING MEDS.    HAS BEEN TAKING 1/2 TAB BENEDRYL NIGHTLY-ANY RELATION?    DO YOU NEED TO SEE IN OFFICE? PLEASE ADVISE.              Stop Benadryl-take meclizine 12.5 mg 2 at bedtime if that does not help with dizziness need to see

## 2018-11-30 ENCOUNTER — OFFICE VISIT (OUTPATIENT)
Dept: FAMILY MEDICINE CLINIC | Facility: CLINIC | Age: 83
End: 2018-11-30

## 2018-11-30 VITALS
DIASTOLIC BLOOD PRESSURE: 81 MMHG | OXYGEN SATURATION: 97 % | TEMPERATURE: 97.4 F | HEIGHT: 63 IN | BODY MASS INDEX: 21.44 KG/M2 | WEIGHT: 121 LBS | SYSTOLIC BLOOD PRESSURE: 149 MMHG | HEART RATE: 70 BPM

## 2018-11-30 DIAGNOSIS — I10 ESSENTIAL HYPERTENSION: Primary | ICD-10-CM

## 2018-11-30 PROCEDURE — 99213 OFFICE O/P EST LOW 20 MIN: CPT | Performed by: FAMILY MEDICINE

## 2018-11-30 RX ORDER — AMLODIPINE BESYLATE 2.5 MG/1
TABLET ORAL
Qty: 30 TABLET | Refills: 5 | Status: SHIPPED | OUTPATIENT
Start: 2018-11-30 | End: 2019-04-10

## 2018-11-30 RX ORDER — ESCITALOPRAM OXALATE 5 MG/1
5 TABLET ORAL DAILY
Qty: 30 TABLET | Refills: 5 | Status: SHIPPED | OUTPATIENT
Start: 2018-11-30 | End: 2018-12-11

## 2018-11-30 NOTE — PROGRESS NOTES
Subjective   Ezekiel Lynch is a 84 y.o. female.     Hypertension   This is a chronic problem. The current episode started more than 1 year ago. The problem has been waxing and waning since onset. The problem is uncontrolled. Pertinent negatives include no chest pain. There are no associated agents to hypertension. Risk factors for coronary artery disease include sedentary lifestyle and post-menopausal state. Past treatments include diuretics and angiotensin blockers. Current antihypertension treatment includes angiotensin blockers and diuretics. The current treatment provides moderate improvement. Compliance problems include exercise.        The following portions of the patient's history were reviewed and updated as appropriate: allergies, current medications, past family history, past medical history, past social history, past surgical history and problem list.    Review of Systems   Cardiovascular: Negative for chest pain and leg swelling.   Neurological: Positive for dizziness.   Psychiatric/Behavioral: The patient is nervous/anxious.        Objective   Physical Exam   Constitutional: She is oriented to person, place, and time. She appears well-developed and well-nourished.   Neck:   Good carotid pulses   Cardiovascular: Normal rate and regular rhythm.   Pulmonary/Chest: Effort normal and breath sounds normal.   Musculoskeletal: She exhibits no edema.   Neurological: She is alert and oriented to person, place, and time.   Psychiatric: She has a normal mood and affect. Her behavior is normal. Judgment and thought content normal.   Nursing note and vitals reviewed.      Assessment/Plan   Ezekiel was seen today for stress and consult.    Diagnoses and all orders for this visit:    Essential hypertension    Other orders  -     escitalopram (LEXAPRO) 5 MG tablet; Take 1 tablet by mouth Daily.  -     amLODIPine (NORVASC) 2.5 MG tablet; 1 bedtime for blood pressure        anxiety level, blood pressure check, 3 weeks  annual physical

## 2018-12-03 ENCOUNTER — TELEPHONE (OUTPATIENT)
Dept: FAMILY MEDICINE CLINIC | Facility: CLINIC | Age: 83
End: 2018-12-03

## 2018-12-03 NOTE — TELEPHONE ENCOUNTER
Patient states she still has dizziness.  Patient has been sleepy and tired since started new blood pressure medicine.  Patient wants to know if she can cut medication in half.  Patient states only taken one of pills for anxiety.        She needs to continue above medicines and let me see her in a few days if necessary

## 2018-12-11 ENCOUNTER — OFFICE VISIT (OUTPATIENT)
Dept: FAMILY MEDICINE CLINIC | Facility: CLINIC | Age: 83
End: 2018-12-11

## 2018-12-11 VITALS
SYSTOLIC BLOOD PRESSURE: 156 MMHG | WEIGHT: 122.2 LBS | OXYGEN SATURATION: 98 % | HEART RATE: 88 BPM | TEMPERATURE: 98.8 F | HEIGHT: 63 IN | BODY MASS INDEX: 21.65 KG/M2 | DIASTOLIC BLOOD PRESSURE: 76 MMHG

## 2018-12-11 DIAGNOSIS — C50.919 MALIGNANT NEOPLASM OF FEMALE BREAST, UNSPECIFIED ESTROGEN RECEPTOR STATUS, UNSPECIFIED LATERALITY, UNSPECIFIED SITE OF BREAST (HCC): ICD-10-CM

## 2018-12-11 DIAGNOSIS — I10 ESSENTIAL HYPERTENSION: Primary | ICD-10-CM

## 2018-12-11 PROCEDURE — 99213 OFFICE O/P EST LOW 20 MIN: CPT | Performed by: FAMILY MEDICINE

## 2018-12-11 NOTE — PROGRESS NOTES
Subjective   Ezekiel Lynch is a 84 y.o. female.     Hypertension   The current episode started more than 1 year ago. The problem has been waxing and waning since onset. The problem is resistant. Pertinent negatives include no chest pain. There are no associated agents to hypertension. Risk factors for coronary artery disease include dyslipidemia, sedentary lifestyle and post-menopausal state. Past treatments include diuretics, angiotensin blockers and calcium channel blockers. Current antihypertension treatment includes angiotensin blockers, calcium channel blockers and diuretics. The current treatment provides moderate improvement. Compliance problems include exercise.        The following portions of the patient's history were reviewed and updated as appropriate: allergies, current medications, past family history, past medical history, past social history, past surgical history and problem list.    Review of Systems   Cardiovascular: Negative for chest pain and leg swelling.        Blood pressure is evaluated by me-126/80   Neurological: Negative for dizziness and light-headedness.       Objective   Physical Exam   Constitutional: She is oriented to person, place, and time. She appears well-developed and well-nourished.   Cardiovascular: Normal rate and regular rhythm.   Pulmonary/Chest: Effort normal and breath sounds normal.   Musculoskeletal: She exhibits no edema.   Neurological: She is alert and oriented to person, place, and time.   Psychiatric: She has a normal mood and affect. Her behavior is normal. Judgment and thought content normal.   Nursing note and vitals reviewed.        Assessment/Plan   Ezekiel was seen today for consult.    Diagnoses and all orders for this visit:    Essential hypertension       Plan-take spironolactone and amlodipine in the morning and losartan at bedtime-stop Lexapro-hydroxyzine when necessary

## 2018-12-19 RX ORDER — PRAVASTATIN SODIUM 80 MG/1
TABLET ORAL
Qty: 90 TABLET | Refills: 0 | Status: SHIPPED | OUTPATIENT
Start: 2018-12-19 | End: 2019-03-21 | Stop reason: SDUPTHER

## 2018-12-19 RX ORDER — LOSARTAN POTASSIUM 100 MG/1
TABLET ORAL
Qty: 90 TABLET | Refills: 0 | Status: SHIPPED | OUTPATIENT
Start: 2018-12-19 | End: 2019-03-21 | Stop reason: SDUPTHER

## 2018-12-21 ENCOUNTER — OFFICE VISIT (OUTPATIENT)
Dept: FAMILY MEDICINE CLINIC | Facility: CLINIC | Age: 83
End: 2018-12-21

## 2018-12-21 VITALS
DIASTOLIC BLOOD PRESSURE: 80 MMHG | HEIGHT: 63 IN | BODY MASS INDEX: 21.79 KG/M2 | HEART RATE: 86 BPM | TEMPERATURE: 99 F | WEIGHT: 123 LBS | OXYGEN SATURATION: 98 % | SYSTOLIC BLOOD PRESSURE: 129 MMHG

## 2018-12-21 DIAGNOSIS — J01.00 ACUTE NON-RECURRENT MAXILLARY SINUSITIS: Primary | ICD-10-CM

## 2018-12-21 PROCEDURE — 99213 OFFICE O/P EST LOW 20 MIN: CPT | Performed by: FAMILY MEDICINE

## 2018-12-21 PROCEDURE — 96372 THER/PROPH/DIAG INJ SC/IM: CPT | Performed by: FAMILY MEDICINE

## 2018-12-21 RX ORDER — METHYLPREDNISOLONE ACETATE 40 MG/ML
20 INJECTION, SUSPENSION INTRA-ARTICULAR; INTRALESIONAL; INTRAMUSCULAR; SOFT TISSUE ONCE
Status: COMPLETED | OUTPATIENT
Start: 2018-12-21 | End: 2018-12-21

## 2018-12-21 RX ORDER — AZITHROMYCIN 250 MG/1
TABLET, FILM COATED ORAL
Qty: 6 TABLET | Refills: 0 | Status: SHIPPED | OUTPATIENT
Start: 2018-12-21 | End: 2019-03-22

## 2018-12-21 RX ADMIN — METHYLPREDNISOLONE ACETATE 20 MG: 40 INJECTION, SUSPENSION INTRA-ARTICULAR; INTRALESIONAL; INTRAMUSCULAR; SOFT TISSUE at 13:39

## 2018-12-21 NOTE — PROGRESS NOTES
Subjective   Ezekiel Lynch is a 84 y.o. female.     Sinusitis   This is a new problem. The current episode started yesterday. The problem has been gradually worsening since onset. The maximum temperature recorded prior to her arrival was 100.4 - 100.9 F. The fever has been present for 1 to 2 days. Associated symptoms include congestion, coughing, a hoarse voice and sinus pressure. Past treatments include nothing.       The following portions of the patient's history were reviewed and updated as appropriate: allergies, current medications, past family history, past medical history, past social history, past surgical history and problem list.    Review of Systems   HENT: Positive for congestion, hoarse voice and sinus pressure.    Respiratory: Positive for cough.        Objective   Physical Exam   Constitutional: She is oriented to person, place, and time.   HENT:   Right Ear: External ear normal.   Left Ear: External ear normal.   Mouth/Throat: Oropharyngeal exudate present.   Cardiovascular: Normal rate and regular rhythm.   Pulmonary/Chest: Effort normal and breath sounds normal.   Musculoskeletal: She exhibits no edema.   Lymphadenopathy:     She has no cervical adenopathy.   Neurological: She is alert and oriented to person, place, and time.   Nursing note and vitals reviewed.      Assessment/Plan   Ezekiel was seen today for sinusitis.    Diagnoses and all orders for this visit:    Acute non-recurrent maxillary sinusitis  -     methylPREDNISolone acetate (DEPO-medrol) injection 20 mg; Inject 0.5 mL into the appropriate muscle as directed by prescriber 1 (One) Time.    Other orders  -     azithromycin (ZITHROMAX Z-STEPHON) 250 MG tablet; Take 2 tablets the first day, then 1 tablet daily for 4 days.         Above

## 2018-12-26 ENCOUNTER — TELEPHONE (OUTPATIENT)
Dept: FAMILY MEDICINE CLINIC | Facility: CLINIC | Age: 83
End: 2018-12-26

## 2018-12-26 RX ORDER — BENZONATATE 100 MG/1
100 CAPSULE ORAL 4 TIMES DAILY PRN
Qty: 25 CAPSULE | Refills: 0 | Status: SHIPPED | OUTPATIENT
Start: 2018-12-26 | End: 2019-01-03 | Stop reason: SDUPTHER

## 2018-12-26 NOTE — TELEPHONE ENCOUNTER
Patient has cough keeping her up at night.  nyquil not helping.  Patient is feeling better other than cough.  Anything else she can take        Tessalon 100 mg every 6 when necessary

## 2019-01-03 ENCOUNTER — OFFICE VISIT (OUTPATIENT)
Dept: FAMILY MEDICINE CLINIC | Facility: CLINIC | Age: 84
End: 2019-01-03

## 2019-01-03 VITALS
TEMPERATURE: 98.6 F | DIASTOLIC BLOOD PRESSURE: 73 MMHG | HEIGHT: 63 IN | OXYGEN SATURATION: 97 % | HEART RATE: 89 BPM | WEIGHT: 120 LBS | SYSTOLIC BLOOD PRESSURE: 112 MMHG | BODY MASS INDEX: 21.26 KG/M2

## 2019-01-03 DIAGNOSIS — R53.83 FATIGUE, UNSPECIFIED TYPE: Primary | ICD-10-CM

## 2019-01-03 DIAGNOSIS — Z12.12 SCREENING FOR COLORECTAL CANCER: ICD-10-CM

## 2019-01-03 DIAGNOSIS — E78.2 MIXED HYPERLIPIDEMIA: ICD-10-CM

## 2019-01-03 DIAGNOSIS — E55.9 VITAMIN D DEFICIENCY: ICD-10-CM

## 2019-01-03 DIAGNOSIS — Z12.11 SCREENING FOR COLORECTAL CANCER: ICD-10-CM

## 2019-01-03 DIAGNOSIS — Z00.00 ANNUAL PHYSICAL EXAM: ICD-10-CM

## 2019-01-03 DIAGNOSIS — C50.919 MALIGNANT NEOPLASM OF FEMALE BREAST, UNSPECIFIED ESTROGEN RECEPTOR STATUS, UNSPECIFIED LATERALITY, UNSPECIFIED SITE OF BREAST (HCC): ICD-10-CM

## 2019-01-03 DIAGNOSIS — I10 ESSENTIAL HYPERTENSION: ICD-10-CM

## 2019-01-03 LAB
25(OH)D3+25(OH)D2 SERPL-MCNC: 53 NG/ML (ref 30–100)
ALBUMIN SERPL-MCNC: 4.5 G/DL (ref 3.5–5)
ALBUMIN/GLOB SERPL: 1.5 G/DL (ref 1.1–2.5)
ALP SERPL-CCNC: 79 U/L (ref 24–120)
ALT SERPL-CCNC: 33 U/L (ref 0–54)
AST SERPL-CCNC: 30 U/L (ref 7–45)
BASOPHILS # BLD AUTO: 0.08 10*3/MM3 (ref 0–0.2)
BASOPHILS NFR BLD AUTO: 1.1 % (ref 0–2)
BILIRUB BLD-MCNC: NEGATIVE MG/DL
BILIRUB SERPL-MCNC: 0.7 MG/DL (ref 0.1–1)
BUN SERPL-MCNC: 14 MG/DL (ref 5–21)
BUN/CREAT SERPL: 23 (ref 7–25)
CALCIUM SERPL-MCNC: 10 MG/DL (ref 8.4–10.4)
CHLORIDE SERPL-SCNC: 93 MMOL/L (ref 98–110)
CHOLEST SERPL-MCNC: 199 MG/DL (ref 130–200)
CLARITY, POC: CLEAR
CO2 SERPL-SCNC: 26 MMOL/L (ref 24–31)
COLOR UR: YELLOW
CREAT SERPL-MCNC: 0.61 MG/DL (ref 0.5–1.4)
EOSINOPHIL # BLD AUTO: 0.09 10*3/MM3 (ref 0–0.7)
EOSINOPHIL NFR BLD AUTO: 1.3 % (ref 0–4)
ERYTHROCYTE [DISTWIDTH] IN BLOOD BY AUTOMATED COUNT: 12.6 % (ref 12–15)
GLOBULIN SER CALC-MCNC: 3 GM/DL
GLUCOSE SERPL-MCNC: 93 MG/DL (ref 70–100)
GLUCOSE UR STRIP-MCNC: NEGATIVE MG/DL
HCT VFR BLD AUTO: 41.3 % (ref 37–47)
HDLC SERPL-MCNC: 59 MG/DL
HGB BLD-MCNC: 13.6 G/DL (ref 12–16)
IMM GRANULOCYTES # BLD: 0.07 10*3/MM3 (ref 0–0.03)
IMM GRANULOCYTES NFR BLD: 1 % (ref 0–5)
KETONES UR QL: ABNORMAL
LDLC SERPL CALC-MCNC: 104 MG/DL (ref 0–99)
LEUKOCYTE EST, POC: NEGATIVE
LYMPHOCYTES # BLD AUTO: 3.43 10*3/MM3 (ref 0.72–4.86)
LYMPHOCYTES NFR BLD AUTO: 47.8 % (ref 15–45)
MCH RBC QN AUTO: 29.5 PG (ref 28–32)
MCHC RBC AUTO-ENTMCNC: 32.9 G/DL (ref 33–36)
MCV RBC AUTO: 89.6 FL (ref 82–98)
MONOCYTES # BLD AUTO: 0.77 10*3/MM3 (ref 0.19–1.3)
MONOCYTES NFR BLD AUTO: 10.7 % (ref 4–12)
NEUTROPHILS # BLD AUTO: 2.74 10*3/MM3 (ref 1.87–8.4)
NEUTROPHILS NFR BLD AUTO: 38.1 % (ref 39–78)
NITRITE UR-MCNC: NEGATIVE MG/ML
NRBC BLD AUTO-RTO: 0 /100 WBC (ref 0–0)
PH UR: 6.5 [PH] (ref 5–8)
PLATELET # BLD AUTO: 368 10*3/MM3 (ref 130–400)
POTASSIUM SERPL-SCNC: 4.7 MMOL/L (ref 3.5–5.3)
PROT SERPL-MCNC: 7.5 G/DL (ref 6.3–8.7)
PROT UR STRIP-MCNC: NEGATIVE MG/DL
RBC # BLD AUTO: 4.61 10*6/MM3 (ref 4.2–5.4)
RBC # UR STRIP: NEGATIVE /UL
SODIUM SERPL-SCNC: 133 MMOL/L (ref 135–145)
SP GR UR: 1.01 (ref 1–1.03)
T4 FREE SERPL-MCNC: 1.03 NG/DL (ref 0.78–2.19)
TRIGL SERPL-MCNC: 181 MG/DL (ref 0–149)
TSH SERPL DL<=0.005 MIU/L-ACNC: 1.91 MIU/ML (ref 0.47–4.68)
UROBILINOGEN UR QL: NORMAL
VLDLC SERPL CALC-MCNC: 36.2 MG/DL
WBC # BLD AUTO: 7.18 10*3/MM3 (ref 4.8–10.8)

## 2019-01-03 PROCEDURE — 81003 URINALYSIS AUTO W/O SCOPE: CPT | Performed by: FAMILY MEDICINE

## 2019-01-03 PROCEDURE — 96160 PT-FOCUSED HLTH RISK ASSMT: CPT | Performed by: FAMILY MEDICINE

## 2019-01-03 PROCEDURE — G0439 PPPS, SUBSEQ VISIT: HCPCS | Performed by: FAMILY MEDICINE

## 2019-01-03 RX ORDER — LEVOFLOXACIN 250 MG/1
250 TABLET ORAL DAILY
Qty: 7 TABLET | Refills: 0 | Status: SHIPPED | OUTPATIENT
Start: 2019-01-03 | End: 2019-01-10

## 2019-01-03 RX ORDER — BENZONATATE 100 MG/1
100 CAPSULE ORAL 4 TIMES DAILY PRN
Qty: 25 CAPSULE | Refills: 0 | Status: SHIPPED | OUTPATIENT
Start: 2019-01-03 | End: 2019-03-22

## 2019-01-03 NOTE — PATIENT INSTRUCTIONS
Fall Prevention in the Home  Falls can cause injuries and can affect people from all age groups. There are many simple things that you can do to make your home safe and to help prevent falls.  What can I do on the outside of my home?  · Regularly repair the edges of walkways and driveways and fix any cracks.  · Remove high doorway thresholds.  · Trim any shrubbery on the main path into your home.  · Use bright outdoor lighting.  · Clear walkways of debris and clutter, including tools and rocks.  · Regularly check that handrails are securely fastened and in good repair. Both sides of any steps should have handrails.  · Install guardrails along the edges of any raised decks or porches.  · Have leaves, snow, and ice cleared regularly.  · Use sand or salt on walkways during winter months.  · In the garage, clean up any spills right away, including grease or oil spills.  What can I do in the bathroom?  · Use night lights.  · Install grab bars by the toilet and in the tub and shower. Do not use towel bars as grab bars.  · Use non-skid mats or decals on the floor of the tub or shower.  · If you need to sit down while you are in the shower, use a plastic, non-slip stool.  · Keep the floor dry. Immediately clean up any water that spills on the floor.  · Remove soap buildup in the tub or shower on a regular basis.  · Attach bath mats securely with double-sided non-slip rug tape.  · Remove throw rugs and other tripping hazards from the floor.  What can I do in the bedroom?  · Use night lights.  · Make sure that a bedside light is easy to reach.  · Do not use oversized bedding that drapes onto the floor.  · Have a firm chair that has side arms to use for getting dressed.  · Remove throw rugs and other tripping hazards from the floor.  What can I do in the kitchen?  · Clean up any spills right away.  · Avoid walking on wet floors.  · Place frequently used items in easy-to-reach places.  · If you need to reach for something  above you, use a sturdy step stool that has a grab bar.  · Keep electrical cables out of the way.  · Do not use floor polish or wax that makes floors slippery. If you have to use wax, make sure that it is non-skid floor wax.  · Remove throw rugs and other tripping hazards from the floor.  What can I do in the stairways?  · Do not leave any items on the stairs.  · Make sure that there are handrails on both sides of the stairs. Fix handrails that are broken or loose. Make sure that handrails are as long as the stairways.  · Check any carpeting to make sure that it is firmly attached to the stairs. Fix any carpet that is loose or worn.  · Avoid having throw rugs at the top or bottom of stairways, or secure the rugs with carpet tape to prevent them from moving.  · Make sure that you have a light switch at the top of the stairs and the bottom of the stairs. If you do not have them, have them installed.  What are some other fall prevention tips?  · Wear closed-toe shoes that fit well and support your feet. Wear shoes that have rubber soles or low heels.  · When you use a stepladder, make sure that it is completely opened and that the sides are firmly locked. Have someone hold the ladder while you are using it. Do not climb a closed stepladder.  · Add color or contrast paint or tape to grab bars and handrails in your home. Place contrasting color strips on the first and last steps.  · Use mobility aids as needed, such as canes, walkers, scooters, and crutches.  · Turn on lights if it is dark. Replace any light bulbs that burn out.  · Set up furniture so that there are clear paths. Keep the furniture in the same spot.  · Fix any uneven floor surfaces.  · Choose a carpet design that does not hide the edge of steps of a stairway.  · Be aware of any and all pets.  · Review your medicines with your healthcare provider. Some medicines can cause dizziness or changes in blood pressure, which increase your risk of falling.  Talk  with your health care provider about other ways that you can decrease your risk of falls. This may include working with a physical therapist or  to improve your strength, balance, and endurance.  This information is not intended to replace advice given to you by your health care provider. Make sure you discuss any questions you have with your health care provider.  Document Released: 2003 Document Revised: 2017 Document Reviewed: 2016  [employer] Interactive Patient Education © 2018 [employer]    Medicare Wellness  Personal Prevention Plan of Service     Date of Office Visit:  2019  Encounter Provider:  Lee Akins MD  Place of Service:  [facility]  Patient Name: Ezekiel Lynch  :  1934    As part of the Medicare Wellness portion of your visit today, we are providing you with this personalized preventive plan of services (PPPS). This plan is based upon recommendations of the [place] Preventive Services Task Force (USPSTF) and the Advisory Committee on Immunization Practices (ACIP).    This lists the preventive care services that should be considered, and provides dates of when you are due. Items listed as completed are up-to-date and do not require any further intervention.    Health Maintenance   Topic Date Due   • TDAP/TD VACCINES (2 - Td) 2019 (Originally 2017)   • ZOSTER VACCINE (2 of 3) 2020 (Originally 2010)   • MEDICARE ANNUAL WELLNESS  2020   • LIPID PANEL  2020   • INFLUENZA VACCINE  Completed   • PNEUMOCOCCAL VACCINES (65+ LOW/MEDIUM RISK)  Completed   • HEPATITIS A VACCINE ADULT  Discontinued       Orders Placed This Encounter   Procedures   • TSH   • T4, free   • Comprehensive Metabolic Panel   • Lipid Panel   • Occult Blood, Fecal By Immunoassay - Stool, Per Rectum     Standing Status:   Future     Standing Expiration Date:   1/3/2020   • Vitamin D 25 Hydroxy   • POC Urinalysis  Dipstick, Automated   • CBC & Differential     Order Specific Question:   Manual Differential     Answer:   No       Return in 6 months (on 7/3/2019).

## 2019-01-03 NOTE — PROGRESS NOTES
QUICK REFERENCE INFORMATION:  The ABCs of the Annual Wellness Visit    Subsequent Medicare Wellness Visit    HEALTH RISK ASSESSMENT    1934    Recent Hospitalizations:  No hospitalization(s) within the last year..        Current Medical Providers:  Patient Care Team:  Lee Akins MD as PCP - General (Family Medicine)        Smoking Status:  Social History     Tobacco Use   Smoking Status Former Smoker   • Packs/day: 0.50   • Years: 25.00   • Pack years: 12.50   • Types: Cigarettes   • Last attempt to quit: 1981   • Years since quittin.8   Smokeless Tobacco Never Used       Alcohol Consumption:  Social History     Substance and Sexual Activity   Alcohol Use Yes       Depression Screen:   PHQ-2/PHQ-9 Depression Screening 1/3/2019   Little interest or pleasure in doing things 0   Feeling down, depressed, or hopeless 0   Trouble falling or staying asleep, or sleeping too much -   Feeling tired or having little energy -   Poor appetite or overeating -   Feeling bad about yourself - or that you are a failure or have let yourself or your family down -   Trouble concentrating on things, such as reading the newspaper or watching television -   Moving or speaking so slowly that other people could have noticed. Or the opposite - being so fidgety or restless that you have been moving around a lot more than usual -   Thoughts that you would be better off dead, or of hurting yourself in some way -   Total Score 0   If you checked off any problems, how difficult have these problems made it for you to do your work, take care of things at home, or get along with other people? -       Health Habits and Functional and Cognitive Screening:  Functional & Cognitive Status 1/3/2019   Do you have difficulty preparing food and eating? No   Do you have difficulty bathing yourself, getting dressed or grooming yourself? No   Do you have difficulty using the toilet? No   Do you have difficulty moving around from  place to place? No   Do you have trouble with steps or getting out of a bed or a chair? No   In the past year have you fallen or experienced a near fall? No   Current Diet Well Balanced Diet   Dental Exam Up to date   Eye Exam Up to date   Exercise (times per week) 5 times per week   Current Exercise Activities Include Walking   Do you need help using the phone?  No   Are you deaf or do you have serious difficulty hearing?  No   Do you need help with transportation? No   Do you need help shopping? No   Do you need help preparing meals?  No   Do you need help with housework?  No   Do you need help with laundry? No   Do you need help taking your medications? No   Do you need help managing money? No   Do you ever drive or ride in a car without wearing a seat belt? No   Have you felt unusual stress, anger or loneliness in the last month? No   Who do you live with? Spouse   If you need help, do you have trouble finding someone available to you? No   Have you been bothered in the last four weeks by sexual problems? No   Do you have difficulty concentrating, remembering or making decisions? No           Does the patient have evidence of cognitive impairment? Yes    Aspirin use counseling: Taking ASA appropriately as indicated      Recent Lab Results:  CMP:  Lab Results   Component Value Date    GLU 91 11/12/2018    BUN 14 11/12/2018    CREATININE 0.71 11/12/2018    EGFRIFNONA 78 11/12/2018    EGFRIFAFRI 95 11/12/2018    BCR 19.7 11/12/2018     (L) 11/12/2018    K 4.5 11/12/2018    CO2 27.0 11/12/2018    CALCIUM 10.2 11/12/2018    PROTENTOTREF 7.0 07/30/2018    ALBUMIN 4.30 07/30/2018    LABGLOBREF 2.7 07/30/2018    LABIL2 1.6 07/30/2018    BILITOT 0.4 07/30/2018    ALKPHOS 73 07/30/2018    AST 32 07/30/2018    ALT 30 07/30/2018     Lipid Panel:  Lab Results   Component Value Date    TRIG 105 12/05/2017    HDL 73 12/05/2017    VLDL 21 12/05/2017     HbA1c:       Visual Acuity:  No exam data present    Age-appropriate  Screening Schedule:  Refer to the list below for future screening recommendations based on patient's age, sex and/or medical conditions. Orders for these recommended tests are listed in the plan section. The patient has been provided with a written plan.    Health Maintenance   Topic Date Due   • TDAP/TD VACCINES (2 - Td) 01/03/2019 (Originally 4/17/2017)   • ZOSTER VACCINE (2 of 3) 07/01/2020 (Originally 2/5/2010)   • LIPID PANEL  01/03/2020   • INFLUENZA VACCINE  Completed   • PNEUMOCOCCAL VACCINES (65+ LOW/MEDIUM RISK)  Completed        Subjective   History of Present Illness    Ezeikel Lynch is a 84 y.o. female who presents for an Subsequent Wellness Visit.    The following portions of the patient's history were reviewed and updated as appropriate: allergies, current medications, past family history, past medical history, past social history, past surgical history and problem list.    Outpatient Medications Prior to Visit   Medication Sig Dispense Refill   • amLODIPine (NORVASC) 2.5 MG tablet 1 bedtime for blood pressure 30 tablet 5   • aspirin 81 MG EC tablet Take 81 mg by mouth Every Other Day.     • azithromycin (ZITHROMAX Z-STEPHON) 250 MG tablet Take 2 tablets the first day, then 1 tablet daily for 4 days. 6 tablet 0   • benzonatate (TESSALON PERLES) 100 MG capsule Take 1 capsule by mouth 4 (Four) Times a Day As Needed for Cough. 25 capsule 0   • clindamycin (CLEOCIN T) 1 % lotion Apply  topically As Needed.     • Docusate Calcium (STOOL SOFTENER PO) Take  by mouth As Needed.     • losartan (COZAAR) 100 MG tablet TAKE 1 TABLET EVERY DAY 90 tablet 0   • meclizine (ANTIVERT) 12.5 MG tablet TAKE (2) TABLETS EVERY NIGHT AT BEDTIME 60 tablet 1   • omeprazole (PriLOSEC) 40 MG capsule Take 40 mg by mouth Daily.     • pravastatin (PRAVACHOL) 80 MG tablet TAKE 1 TABLET EVERY DAY 90 tablet 0   • spironolactone (ALDACTONE) 25 MG tablet TAKE 1 TABLET EVERY DAY 90 tablet 0   • Vitamin D, Cholecalciferol, 1000 units capsule  Take 1,000 Units by mouth Daily.       No facility-administered medications prior to visit.        Patient Active Problem List   Diagnosis   • Essential hypertension   • Malignant neoplasm of female breast (CMS/HCC)   • Hyperlipidemia   • Gastroesophageal reflux disease without esophagitis       Advance Care Planning:  has NO advance directive - information provided to the patient today    Identification of Risk Factors:  Risk factors include: increased fall risk.    Review of Systems   HENT: Positive for sinus pressure.    Respiratory: Positive for cough.    Cardiovascular: Negative for chest pain and leg swelling.   Gastrointestinal: Negative for abdominal pain.   All other systems reviewed and are negative.      Compared to one year ago, the patient feels her physical health is the same.  Compared to one year ago, the patient feels her mental health is the same.    Objective     Physical Exam   Constitutional: She is oriented to person, place, and time. She appears well-developed and well-nourished.   HENT:   Right Ear: External ear normal.   Left Ear: External ear normal.   Mouth/Throat: Oropharyngeal exudate present.   Eyes: EOM are normal. Pupils are equal, round, and reactive to light.   Neck: No thyromegaly present.   Good carotid pulses   Cardiovascular: Normal rate and regular rhythm.   Pulmonary/Chest: Effort normal and breath sounds normal.   Breasts-no masses noted   Abdominal: Soft. Bowel sounds are normal. She exhibits no mass.   Musculoskeletal: She exhibits no edema.   Lymphadenopathy:     She has no cervical adenopathy.   Neurological: She is alert and oriented to person, place, and time.   Skin: Skin is warm and dry. Capillary refill takes less than 2 seconds.   Psychiatric: She has a normal mood and affect. Her behavior is normal. Judgment and thought content normal.   Nursing note and vitals reviewed.      Vitals:    01/03/19 0944   BP: 112/73   BP Location: Right arm   Patient Position: Sitting  "  Cuff Size: Adult   Pulse: 89   Temp: 98.6 °F (37 °C)   TempSrc: Temporal   SpO2: 97%   Weight: 54.4 kg (120 lb)   Height: 160 cm (62.99\")   PainSc: 0-No pain       Patient's Body mass index is 21.26 kg/m². BMI is within normal parameters. No follow-up required.      Assessment/Plan   Patient Self-Management and Personalized Health Advice  The patient has been provided with information about: fall prevention and preventive services including:   · Advance directive, Colorectal cancer screening, cologuard test ordered, Fall Risk plan of care done.    Visit Diagnoses:    ICD-10-CM ICD-9-CM   1. Fatigue, unspecified type R53.83 780.79   2. Malignant neoplasm of female breast, unspecified estrogen receptor status, unspecified laterality, unspecified site of breast (CMS/HCC) C50.919 174.9   3. Mixed hyperlipidemia E78.2 272.2   4. Essential hypertension I10 401.9   5. Screening for colorectal cancer Z12.11 V76.51    Z12.12    6. Vitamin D deficiency E55.9 268.9       Orders Placed This Encounter   Procedures   • TSH   • T4, free   • Comprehensive Metabolic Panel   • Lipid Panel   • Occult Blood, Fecal By Immunoassay - Stool, Per Rectum     Standing Status:   Future     Standing Expiration Date:   1/3/2020   • Vitamin D 25 Hydroxy   • POC Urinalysis Dipstick, Automated   • CBC & Differential     Order Specific Question:   Manual Differential     Answer:   No       Outpatient Encounter Medications as of 1/3/2019   Medication Sig Dispense Refill   • amLODIPine (NORVASC) 2.5 MG tablet 1 bedtime for blood pressure 30 tablet 5   • aspirin 81 MG EC tablet Take 81 mg by mouth Every Other Day.     • azithromycin (ZITHROMAX Z-STEPHON) 250 MG tablet Take 2 tablets the first day, then 1 tablet daily for 4 days. 6 tablet 0   • benzonatate (TESSALON PERLES) 100 MG capsule Take 1 capsule by mouth 4 (Four) Times a Day As Needed for Cough. 25 capsule 0   • clindamycin (CLEOCIN T) 1 % lotion Apply  topically As Needed.     • Docusate Calcium " (STOOL SOFTENER PO) Take  by mouth As Needed.     • losartan (COZAAR) 100 MG tablet TAKE 1 TABLET EVERY DAY 90 tablet 0   • meclizine (ANTIVERT) 12.5 MG tablet TAKE (2) TABLETS EVERY NIGHT AT BEDTIME 60 tablet 1   • omeprazole (PriLOSEC) 40 MG capsule Take 40 mg by mouth Daily.     • pravastatin (PRAVACHOL) 80 MG tablet TAKE 1 TABLET EVERY DAY 90 tablet 0   • spironolactone (ALDACTONE) 25 MG tablet TAKE 1 TABLET EVERY DAY 90 tablet 0   • Vitamin D, Cholecalciferol, 1000 units capsule Take 1,000 Units by mouth Daily.       No facility-administered encounter medications on file as of 1/3/2019.        Reviewed use of high risk medication in the elderly: yes  Reviewed for potential of harmful drug interactions in the elderly: yes    Follow Up:  No Follow-up on file.     An After Visit Summary and PPPS with all of these plans were given to the patient.       above plus Flonase and saline

## 2019-02-19 ENCOUNTER — TELEPHONE (OUTPATIENT)
Dept: FAMILY MEDICINE CLINIC | Facility: CLINIC | Age: 84
End: 2019-02-19

## 2019-02-19 NOTE — TELEPHONE ENCOUNTER
PT STATES CURRENTLY TAKING AMLODIPINE 2.5MG DAILY-HAS BEEN TAKING EVERY MORNING. STATES MAKES HER FEEL TIRED-REQUESTING IF DOSE CAN BE ADJUSTED BY CUTTING IN 1/2 OR TAKE EVERY OTHER DAY? IF SHE TAKES AT BEDTIME-WILL IT KEEP HER UP AT NIGHT OR SEVERAL TRIPS TO BATHROOM DURING NIGHT. BP IS STAYING CONTROLLED. GOING TO FITNESS CENTER ON AVERAGE 3 DAYS/WEEK PLEASE ADVISE-EXHAUSTED WHEN SHE GETS DONE.      [facility]       take the medicine on the days she does not go to the fitness center i.e. every other day if fitness centers Monday Wednesday Friday take the medicine on Tuesday Thursday Saturday

## 2019-02-19 NOTE — TELEPHONE ENCOUNTER
PT WAS ADVISED TO TAKE MEDS ON DAYS SHE DOES NOT GO TO FITNESS CENTER OR EVERY OTHER DAY-PT VERBALIZED UNDERSTAND.

## 2019-02-21 RX ORDER — SPIRONOLACTONE 25 MG/1
TABLET ORAL
Qty: 90 TABLET | Refills: 3 | Status: SHIPPED | OUTPATIENT
Start: 2019-02-21

## 2019-03-21 RX ORDER — LOSARTAN POTASSIUM 100 MG/1
TABLET ORAL
Qty: 90 TABLET | Refills: 2 | Status: SHIPPED | OUTPATIENT
Start: 2019-03-21 | End: 2019-12-13 | Stop reason: SDUPTHER

## 2019-03-21 RX ORDER — PRAVASTATIN SODIUM 80 MG/1
TABLET ORAL
Qty: 90 TABLET | Refills: 2 | Status: SHIPPED | OUTPATIENT
Start: 2019-03-21 | End: 2019-12-13 | Stop reason: SDUPTHER

## 2019-03-22 ENCOUNTER — OFFICE VISIT (OUTPATIENT)
Dept: FAMILY MEDICINE CLINIC | Facility: CLINIC | Age: 84
End: 2019-03-22

## 2019-03-22 VITALS
TEMPERATURE: 98.2 F | BODY MASS INDEX: 22.82 KG/M2 | WEIGHT: 124 LBS | HEIGHT: 62 IN | SYSTOLIC BLOOD PRESSURE: 148 MMHG | DIASTOLIC BLOOD PRESSURE: 80 MMHG

## 2019-03-22 DIAGNOSIS — I10 ESSENTIAL HYPERTENSION: Primary | ICD-10-CM

## 2019-03-22 LAB
BASOPHILS # BLD AUTO: 0.05 10*3/MM3 (ref 0–0.2)
BASOPHILS NFR BLD AUTO: 0.6 % (ref 0–2)
BUN SERPL-MCNC: 14 MG/DL (ref 5–21)
BUN/CREAT SERPL: 23.7 (ref 7–25)
CALCIUM SERPL-MCNC: 10.2 MG/DL (ref 8.4–10.4)
CHLORIDE SERPL-SCNC: 95 MMOL/L (ref 98–110)
CO2 SERPL-SCNC: 27 MMOL/L (ref 24–31)
CREAT SERPL-MCNC: 0.59 MG/DL (ref 0.5–1.4)
EOSINOPHIL # BLD AUTO: 0.1 10*3/MM3 (ref 0–0.7)
EOSINOPHIL NFR BLD AUTO: 1.3 % (ref 0–4)
ERYTHROCYTE [DISTWIDTH] IN BLOOD BY AUTOMATED COUNT: 12.7 % (ref 12–15)
GLUCOSE SERPL-MCNC: 110 MG/DL (ref 70–100)
HCT VFR BLD AUTO: 39.2 % (ref 37–47)
HGB BLD-MCNC: 13.1 G/DL (ref 12–16)
IMM GRANULOCYTES # BLD AUTO: 0.02 10*3/MM3 (ref 0–0.05)
IMM GRANULOCYTES NFR BLD AUTO: 0.3 % (ref 0–5)
LYMPHOCYTES # BLD AUTO: 3.12 10*3/MM3 (ref 0.72–4.86)
LYMPHOCYTES NFR BLD AUTO: 39.7 % (ref 15–45)
MCH RBC QN AUTO: 29.4 PG (ref 28–32)
MCHC RBC AUTO-ENTMCNC: 33.4 G/DL (ref 33–36)
MCV RBC AUTO: 87.9 FL (ref 82–98)
MONOCYTES # BLD AUTO: 0.59 10*3/MM3 (ref 0.19–1.3)
MONOCYTES NFR BLD AUTO: 7.5 % (ref 4–12)
NEUTROPHILS # BLD AUTO: 3.98 10*3/MM3 (ref 1.87–8.4)
NEUTROPHILS NFR BLD AUTO: 50.6 % (ref 39–78)
NRBC BLD AUTO-RTO: 0 /100 WBC (ref 0–0)
PLATELET # BLD AUTO: 309 10*3/MM3 (ref 130–400)
POTASSIUM SERPL-SCNC: 4.3 MMOL/L (ref 3.5–5.3)
RBC # BLD AUTO: 4.46 10*6/MM3 (ref 4.2–5.4)
SODIUM SERPL-SCNC: 134 MMOL/L (ref 135–145)
WBC # BLD AUTO: 7.86 10*3/MM3 (ref 4.8–10.8)

## 2019-03-22 PROCEDURE — 99214 OFFICE O/P EST MOD 30 MIN: CPT | Performed by: FAMILY MEDICINE

## 2019-03-22 NOTE — PROGRESS NOTES
Subjective   Ezekiel Lynch is a 84 y.o. female.      hypertension      Hypertension   This is a chronic problem. The current episode started more than 1 year ago. The problem has been waxing and waning since onset. The problem is resistant. Pertinent negatives include no chest pain or headaches. There are no associated agents to hypertension. Past treatments include angiotensin blockers, diuretics and calcium channel blockers. Current antihypertension treatment includes angiotensin blockers, calcium channel blockers and diuretics. The current treatment provides moderate improvement. There are no compliance problems.        The following portions of the patient's history were reviewed and updated as appropriate: allergies, current medications, past family history, past medical history, past social history, past surgical history and problem list.    Review of Systems   Cardiovascular: Negative for chest pain and leg swelling.   Neurological: Negative for headaches.       Objective   Physical Exam   Constitutional: She is oriented to person, place, and time.   Cardiovascular: Normal rate and regular rhythm.   Pulmonary/Chest: Effort normal and breath sounds normal.   Musculoskeletal: She exhibits no edema.   Neurological: She is alert and oriented to person, place, and time.   Skin: Capillary refill takes less than 2 seconds.   Psychiatric: She has a normal mood and affect. Her behavior is normal. Judgment and thought content normal.   Nursing note and vitals reviewed.      Assessment/Plan   Patient Active Problem List   Diagnosis   • Essential hypertension   • Malignant neoplasm of female breast (CMS/HCC)   • Hyperlipidemia   • Gastroesophageal reflux disease without esophagitis     Ezekiel was seen today for hypertension.    Diagnoses and all orders for this visit:    Essential hypertension  -     Basic Metabolic Panel  -     CBC & Differential       Return if symptoms worsen or fail to improve, for Next scheduled  follow up.    Plan-losartan amlodipine at night Spironolactone in the morning when she does not work out-lab

## 2019-04-10 ENCOUNTER — TELEPHONE (OUTPATIENT)
Dept: FAMILY MEDICINE CLINIC | Facility: CLINIC | Age: 84
End: 2019-04-10

## 2019-04-10 RX ORDER — INDAPAMIDE 1.25 MG/1
1.25 TABLET, FILM COATED ORAL EVERY MORNING
COMMUNITY
Start: 2019-04-10 | End: 2019-04-11 | Stop reason: SDUPTHER

## 2019-04-10 NOTE — TELEPHONE ENCOUNTER
PT STATES SHE HAS QUIT TAKING AMLODIPINE ABOUT 1 WK AGO D/T SYMPTOMS-CHEST PAINS, BACK PAIN-ACID REFLUX.  STATES HAS HAD NO PROBLEMS SINCE SHE QUIT TAKING MED. IS THERE SOMETHING ELSE SHE CAN TAKE?      DO YOU NEED TO SEE IN OFFICE OR SEND IN SOMETHING.     [facility]     HAS OLD RX OF INDAPAMIDE 1.25MG-ASKING IF SHE NEEDS TO TAKE THIS.

## 2019-04-10 NOTE — TELEPHONE ENCOUNTER
Stop amlodipine- start indapamide-monitor blood pressure-as long as it is less than 135 systolically okay

## 2019-04-10 NOTE — TELEPHONE ENCOUNTER
PT WAS ADVISED TO STOP AMLODIPINE-MONITOR BP SYSTOLIC TO BE LESS THAN 135. OK TO RESUME INDAPAMIDE-WILL CONTACT PHARMACY TO SEE IF MED STILL OK TO TAKE. ADVISED PT IF NEEDS NEW RX TO CONTACT OFFICE FOR REFILL. VERBALIZED UNDERSTANDING.

## 2019-04-11 RX ORDER — INDAPAMIDE 1.25 MG/1
1.25 TABLET, FILM COATED ORAL EVERY MORNING
Qty: 30 TABLET | Refills: 0 | Status: SHIPPED | OUTPATIENT
Start: 2019-04-11 | End: 2019-06-12

## 2019-05-14 ENCOUNTER — TELEPHONE (OUTPATIENT)
Dept: FAMILY MEDICINE CLINIC | Facility: CLINIC | Age: 84
End: 2019-05-14

## 2019-05-14 NOTE — TELEPHONE ENCOUNTER
PT IS HAVING SYMPTOMS OF DIVERTICULITIS. BUT SHE DOESN'T THINK IT IS INFECTED BECAUSE SHE DOES NOT HAVE A FEVER.    SORE LOWER LEFT ABDOMIN SINCE Saturday- SHE ATE SEVERAL STRAWBERRIES.     DO YOU WANT TO CALL HER IN SOMETHING OR HAVE HER COME IN?       PHARMACY [facility]

## 2019-06-07 RX ORDER — CLINDAMYCIN PHOSPHATE 10 UG/ML
LOTION TOPICAL 2 TIMES DAILY
Qty: 60 ML | Refills: 2 | Status: SHIPPED | OUTPATIENT
Start: 2019-06-07

## 2019-06-12 ENCOUNTER — OFFICE VISIT (OUTPATIENT)
Dept: FAMILY MEDICINE CLINIC | Facility: CLINIC | Age: 84
End: 2019-06-12

## 2019-06-12 VITALS
HEIGHT: 62 IN | BODY MASS INDEX: 22.67 KG/M2 | SYSTOLIC BLOOD PRESSURE: 130 MMHG | OXYGEN SATURATION: 98 % | WEIGHT: 123.2 LBS | HEART RATE: 77 BPM | TEMPERATURE: 98.4 F | DIASTOLIC BLOOD PRESSURE: 79 MMHG

## 2019-06-12 DIAGNOSIS — I10 HYPERTENSION, UNSPECIFIED TYPE: Primary | ICD-10-CM

## 2019-06-12 LAB
BILIRUB BLD-MCNC: NEGATIVE MG/DL
CLARITY, POC: CLEAR
COLOR UR: YELLOW
GLUCOSE UR STRIP-MCNC: NEGATIVE MG/DL
KETONES UR QL: ABNORMAL
LEUKOCYTE EST, POC: NEGATIVE
NITRITE UR-MCNC: NEGATIVE MG/ML
PH UR: 7 [PH] (ref 5–8)
PROT UR STRIP-MCNC: NEGATIVE MG/DL
RBC # UR STRIP: NEGATIVE /UL
SP GR UR: 1.01 (ref 1–1.03)
UROBILINOGEN UR QL: NORMAL

## 2019-06-12 PROCEDURE — 81003 URINALYSIS AUTO W/O SCOPE: CPT | Performed by: FAMILY MEDICINE

## 2019-06-12 PROCEDURE — 99213 OFFICE O/P EST LOW 20 MIN: CPT | Performed by: FAMILY MEDICINE

## 2019-06-12 NOTE — PROGRESS NOTES
Subjective   Ezekiel Lynch is a 84 y.o. female.     84-year-old female with hypertension      Hypertension   This is a chronic problem. The current episode started more than 1 year ago. The problem has been resolved since onset. The problem is controlled. Pertinent negatives include no chest pain. Risk factors for coronary artery disease include dyslipidemia, post-menopausal state and sedentary lifestyle. Past treatments include diuretics and angiotensin blockers. Current antihypertension treatment includes angiotensin blockers and diuretics. The current treatment provides significant improvement.       The following portions of the patient's history were reviewed and updated as appropriate: allergies, current medications, past family history, past medical history, past social history, past surgical history and problem list.    Review of Systems   Cardiovascular: Negative for chest pain and leg swelling.   Gastrointestinal:        History of stable splenic artery aneurysm   Genitourinary:        History of recurrent urinary tract infections       Objective   Physical Exam   Constitutional: She is oriented to person, place, and time. She appears well-developed and well-nourished.   Cardiovascular: Normal rate and regular rhythm.   Pulmonary/Chest: Effort normal and breath sounds normal.   Abdominal: Soft. She exhibits no mass.   Musculoskeletal: She exhibits no edema.   Neurological: She is alert and oriented to person, place, and time.   Psychiatric: She has a normal mood and affect. Her behavior is normal. Judgment and thought content normal.   Nursing note and vitals reviewed.      Assessment/Plan   Ezekiel was seen today for follow-up.    Diagnoses and all orders for this visit:    Hypertension, unspecified type       Plan-continue above-getting ready to move to [facility]

## 2019-07-01 ENCOUNTER — TELEPHONE (OUTPATIENT)
Dept: FAMILY MEDICINE CLINIC | Facility: CLINIC | Age: 84
End: 2019-07-01

## 2019-07-01 NOTE — TELEPHONE ENCOUNTER
Pt called and said she has had elevated blood pressures in the evening. She has been taking her aldactone every day and taking Losartan 100 mg at night.     bp is rising around 4 o'clock in the afternoon.     Please advise       Pt has moved to [facility]- has not found a doctor at this time.

## 2019-12-13 RX ORDER — LOSARTAN POTASSIUM 100 MG/1
TABLET ORAL
Qty: 90 TABLET | Refills: 0 | Status: SHIPPED | OUTPATIENT
Start: 2019-12-13

## 2019-12-13 RX ORDER — PRAVASTATIN SODIUM 80 MG/1
TABLET ORAL
Qty: 90 TABLET | Refills: 3 | Status: SHIPPED | OUTPATIENT
Start: 2019-12-13

## 2024-08-20 ENCOUNTER — READMISSION MANAGEMENT (OUTPATIENT)
Dept: CALL CENTER | Facility: HOSPITAL | Age: 89
End: 2024-08-20
Payer: MEDICARE

## 2024-08-20 NOTE — OUTREACH NOTE
Prep Survey      Flowsheet Row Responses   [facility] facility patient discharged from? Non-BH   Is LACE score < 7 ? Non-BH Discharge   Eligibility Not Eligible   What are the reasons patient is not eligible? Other  [has not seen [facility] in over three years]   Does the patient have one of the following disease processes/diagnoses(primary or secondary)? Other   Prep survey completed? Yes            Waleska TORRE - Registered Nurse